# Patient Record
Sex: FEMALE | Race: BLACK OR AFRICAN AMERICAN | NOT HISPANIC OR LATINO | Employment: OTHER | URBAN - METROPOLITAN AREA
[De-identification: names, ages, dates, MRNs, and addresses within clinical notes are randomized per-mention and may not be internally consistent; named-entity substitution may affect disease eponyms.]

---

## 2023-04-18 ENCOUNTER — HOSPITAL ENCOUNTER (OUTPATIENT)
Dept: DATA CONVERSION | Facility: HOSPITAL | Age: 73
End: 2023-04-18
Attending: INTERNAL MEDICINE | Admitting: INTERNAL MEDICINE
Payer: COMMERCIAL

## 2023-04-18 DIAGNOSIS — Z86.010 PERSONAL HISTORY OF COLONIC POLYPS: ICD-10-CM

## 2023-04-18 DIAGNOSIS — B20 HUMAN IMMUNODEFICIENCY VIRUS (HIV) DISEASE (MULTI): ICD-10-CM

## 2023-04-18 DIAGNOSIS — Z12.11 ENCOUNTER FOR SCREENING FOR MALIGNANT NEOPLASM OF COLON: ICD-10-CM

## 2023-04-18 DIAGNOSIS — K64.8 OTHER HEMORRHOIDS: ICD-10-CM

## 2023-04-18 DIAGNOSIS — Z86.19 PERSONAL HISTORY OF OTHER INFECTIOUS AND PARASITIC DISEASES: ICD-10-CM

## 2023-04-18 DIAGNOSIS — K57.30 DIVERTICULOSIS OF LARGE INTESTINE WITHOUT PERFORATION OR ABSCESS WITHOUT BLEEDING: ICD-10-CM

## 2023-04-18 DIAGNOSIS — D12.5 BENIGN NEOPLASM OF SIGMOID COLON: ICD-10-CM

## 2023-05-01 LAB
COMPLETE PATHOLOGY REPORT: NORMAL
CONVERTED CLINICAL DIAGNOSIS-HISTORY: NORMAL
CONVERTED FINAL DIAGNOSIS: NORMAL
CONVERTED FINAL REPORT PDF LINK TO COPY AND PASTE: NORMAL
CONVERTED GROSS DESCRIPTION: NORMAL

## 2023-05-09 LAB
ALANINE AMINOTRANSFERASE (SGPT) (U/L) IN SER/PLAS: 9 U/L (ref 7–45)
ALBUMIN (G/DL) IN SER/PLAS: 4.3 G/DL (ref 3.4–5)
ALKALINE PHOSPHATASE (U/L) IN SER/PLAS: 54 U/L (ref 33–136)
ANION GAP IN SER/PLAS: 13 MMOL/L (ref 10–20)
ASPARTATE AMINOTRANSFERASE (SGOT) (U/L) IN SER/PLAS: 18 U/L (ref 9–39)
BASOPHILS (10*3/UL) IN BLOOD BY AUTOMATED COUNT: 0.08 X10E9/L (ref 0–0.1)
BASOPHILS/100 LEUKOCYTES IN BLOOD BY AUTOMATED COUNT: 1.2 % (ref 0–2)
BILIRUBIN TOTAL (MG/DL) IN SER/PLAS: 0.3 MG/DL (ref 0–1.2)
CALCIUM (MG/DL) IN SER/PLAS: 9.7 MG/DL (ref 8.6–10.6)
CARBON DIOXIDE, TOTAL (MMOL/L) IN SER/PLAS: 27 MMOL/L (ref 21–32)
CD3+CD4+ ABSOLUTE: 1.53 X10E9/L (ref 0.35–2.74)
CD3+CD8+ ABSOLUTE: 0.94 X10E9/L (ref 0.08–1.49)
CD4/CD8 RATIO: 1.63 (ref 1–3.5)
CD45%: 100 %
CHLORIDE (MMOL/L) IN SER/PLAS: 103 MMOL/L (ref 98–107)
CP CD3+CD4+%: 52 % (ref 29–57)
CP CD3+CD8+%: 32 % (ref 7–31)
CREATININE (MG/DL) IN SER/PLAS: 1.03 MG/DL (ref 0.5–1.05)
EOSINOPHILS (10*3/UL) IN BLOOD BY AUTOMATED COUNT: 0.07 X10E9/L (ref 0–0.4)
EOSINOPHILS/100 LEUKOCYTES IN BLOOD BY AUTOMATED COUNT: 1 % (ref 0–6)
ERYTHROCYTE DISTRIBUTION WIDTH (RATIO) BY AUTOMATED COUNT: 17.7 % (ref 11.5–14.5)
ERYTHROCYTE MEAN CORPUSCULAR HEMOGLOBIN CONCENTRATION (G/DL) BY AUTOMATED: 30.6 G/DL (ref 32–36)
ERYTHROCYTE MEAN CORPUSCULAR VOLUME (FL) BY AUTOMATED COUNT: 83 FL (ref 80–100)
ERYTHROCYTES (10*6/UL) IN BLOOD BY AUTOMATED COUNT: 4.01 X10E12/L (ref 4–5.2)
FMETH: ABNORMAL
FSIT1: ABNORMAL
GFR FEMALE: 58 ML/MIN/1.73M2
GLUCOSE (MG/DL) IN SER/PLAS: 85 MG/DL (ref 74–99)
HEMATOCRIT (%) IN BLOOD BY AUTOMATED COUNT: 33.3 % (ref 36–46)
HEMOGLOBIN (G/DL) IN BLOOD: 10.2 G/DL (ref 12–16)
IMMATURE GRANULOCYTES/100 LEUKOCYTES IN BLOOD BY AUTOMATED COUNT: 0.4 % (ref 0–0.9)
LEUKOCYTES (10*3/UL) IN BLOOD BY AUTOMATED COUNT: 7 X10E9/L (ref 4.4–11.3)
LYMPHOCYTES (10*3/UL) IN BLOOD BY AUTOMATED COUNT: 2.95 X10E9/L (ref 0.8–3)
LYMPHOCYTES/100 LEUKOCYTES IN BLOOD BY AUTOMATED COUNT: 42.4 % (ref 13–44)
MONOCYTES (10*3/UL) IN BLOOD BY AUTOMATED COUNT: 0.44 X10E9/L (ref 0.05–0.8)
MONOCYTES/100 LEUKOCYTES IN BLOOD BY AUTOMATED COUNT: 6.3 % (ref 2–10)
NEUTROPHILS (10*3/UL) IN BLOOD BY AUTOMATED COUNT: 3.38 X10E9/L (ref 1.6–5.5)
NEUTROPHILS/100 LEUKOCYTES IN BLOOD BY AUTOMATED COUNT: 48.7 % (ref 40–80)
NRBC (PER 100 WBCS) BY AUTOMATED COUNT: 0 /100 WBC (ref 0–0)
PLATELETS (10*3/UL) IN BLOOD AUTOMATED COUNT: 314 X10E9/L (ref 150–450)
POTASSIUM (MMOL/L) IN SER/PLAS: 4.1 MMOL/L (ref 3.5–5.3)
PROTEIN TOTAL: 7.3 G/DL (ref 6.4–8.2)
SODIUM (MMOL/L) IN SER/PLAS: 139 MMOL/L (ref 136–145)
UREA NITROGEN (MG/DL) IN SER/PLAS: 14 MG/DL (ref 6–23)

## 2023-05-10 LAB
HIV-1 RNA PCR VIRAL LOAD LOG: ABNORMAL LOG10 CPY/ML
HIV-1 RNA VIRAL LOAD: ABNORMAL COPIES/ML

## 2023-09-06 LAB
ALANINE AMINOTRANSFERASE (SGPT) (U/L) IN SER/PLAS: 11 U/L (ref 7–45)
ALBUMIN (G/DL) IN SER/PLAS: 4 G/DL (ref 3.4–5)
ALKALINE PHOSPHATASE (U/L) IN SER/PLAS: 57 U/L (ref 33–136)
ANION GAP IN SER/PLAS: 15 MMOL/L (ref 10–20)
ASPARTATE AMINOTRANSFERASE (SGOT) (U/L) IN SER/PLAS: 22 U/L (ref 9–39)
BASOPHILS (10*3/UL) IN BLOOD BY AUTOMATED COUNT: 0.06 X10E9/L (ref 0–0.1)
BASOPHILS/100 LEUKOCYTES IN BLOOD BY AUTOMATED COUNT: 0.8 % (ref 0–2)
BILIRUBIN TOTAL (MG/DL) IN SER/PLAS: 0.3 MG/DL (ref 0–1.2)
CALCIUM (MG/DL) IN SER/PLAS: 9.3 MG/DL (ref 8.6–10.6)
CARBON DIOXIDE, TOTAL (MMOL/L) IN SER/PLAS: 25 MMOL/L (ref 21–32)
CHLORIDE (MMOL/L) IN SER/PLAS: 101 MMOL/L (ref 98–107)
CHOLESTEROL (MG/DL) IN SER/PLAS: 189 MG/DL (ref 0–199)
CHOLESTEROL IN HDL (MG/DL) IN SER/PLAS: 76.1 MG/DL
CHOLESTEROL/HDL RATIO: 2.5
CREATININE (MG/DL) IN SER/PLAS: 1.14 MG/DL (ref 0.5–1.05)
EOSINOPHILS (10*3/UL) IN BLOOD BY AUTOMATED COUNT: 0.08 X10E9/L (ref 0–0.4)
EOSINOPHILS/100 LEUKOCYTES IN BLOOD BY AUTOMATED COUNT: 1.1 % (ref 0–6)
ERYTHROCYTE DISTRIBUTION WIDTH (RATIO) BY AUTOMATED COUNT: 17.1 % (ref 11.5–14.5)
ERYTHROCYTE MEAN CORPUSCULAR HEMOGLOBIN CONCENTRATION (G/DL) BY AUTOMATED: 30.8 G/DL (ref 32–36)
ERYTHROCYTE MEAN CORPUSCULAR VOLUME (FL) BY AUTOMATED COUNT: 85 FL (ref 80–100)
ERYTHROCYTES (10*6/UL) IN BLOOD BY AUTOMATED COUNT: 4 X10E12/L (ref 4–5.2)
ESTIMATED AVERAGE GLUCOSE FOR HBA1C: 117 MG/DL
GFR FEMALE: 51 ML/MIN/1.73M2
GLUCOSE (MG/DL) IN SER/PLAS: 82 MG/DL (ref 74–99)
HEMATOCRIT (%) IN BLOOD BY AUTOMATED COUNT: 33.8 % (ref 36–46)
HEMOGLOBIN (G/DL) IN BLOOD: 10.4 G/DL (ref 12–16)
HEMOGLOBIN A1C/HEMOGLOBIN TOTAL IN BLOOD: 5.7 %
IMMATURE GRANULOCYTES/100 LEUKOCYTES IN BLOOD BY AUTOMATED COUNT: 0.3 % (ref 0–0.9)
LDL: 100 MG/DL (ref 0–99)
LEUKOCYTES (10*3/UL) IN BLOOD BY AUTOMATED COUNT: 7.6 X10E9/L (ref 4.4–11.3)
LYMPHOCYTES (10*3/UL) IN BLOOD BY AUTOMATED COUNT: 2.89 X10E9/L (ref 0.8–3)
LYMPHOCYTES/100 LEUKOCYTES IN BLOOD BY AUTOMATED COUNT: 38 % (ref 13–44)
MONOCYTES (10*3/UL) IN BLOOD BY AUTOMATED COUNT: 0.54 X10E9/L (ref 0.05–0.8)
MONOCYTES/100 LEUKOCYTES IN BLOOD BY AUTOMATED COUNT: 7.1 % (ref 2–10)
NEUTROPHILS (10*3/UL) IN BLOOD BY AUTOMATED COUNT: 4.02 X10E9/L (ref 1.6–5.5)
NEUTROPHILS/100 LEUKOCYTES IN BLOOD BY AUTOMATED COUNT: 52.7 % (ref 40–80)
NRBC (PER 100 WBCS) BY AUTOMATED COUNT: 0 /100 WBC (ref 0–0)
PLATELETS (10*3/UL) IN BLOOD AUTOMATED COUNT: 289 X10E9/L (ref 150–450)
POTASSIUM (MMOL/L) IN SER/PLAS: 4 MMOL/L (ref 3.5–5.3)
PROTEIN TOTAL: 7 G/DL (ref 6.4–8.2)
SODIUM (MMOL/L) IN SER/PLAS: 137 MMOL/L (ref 136–145)
SYPHILIS TOTAL AB: REACTIVE
TRIGLYCERIDE (MG/DL) IN SER/PLAS: 64 MG/DL (ref 0–149)
UREA NITROGEN (MG/DL) IN SER/PLAS: 17 MG/DL (ref 6–23)
VLDL: 13 MG/DL (ref 0–40)

## 2023-09-07 LAB
CD3+CD4+ ABSOLUTE: 1.45 X10E9/L (ref 0.35–2.74)
CD3+CD8+ ABSOLUTE: 0.9 X10E9/L (ref 0.08–1.49)
CD4/CD8 RATIO: 1.61 (ref 1–3.5)
CD45%: 100 %
CP CD3+CD4+%: 50 % (ref 29–57)
CP CD3+CD8+%: 31 % (ref 7–31)
FMETH: NORMAL
FSIT1: NORMAL
HIV-1 RNA PCR VIRAL LOAD LOG: NORMAL LOG10 CPY/ML
HIV-1 RNA VIRAL LOAD: NOT DETECTED COPIES/ML

## 2023-09-08 LAB
RPR: NONREACTIVE
SYPHILIS INTEGRATED INTERPRETATION: ABNORMAL
SYPHILIS TOTAL AB: REACTIVE
TREPONEMA PALLIDUM CONFIRMATION: REACTIVE

## 2023-09-12 LAB
CHLAMYDIA TRACH., AMPLIFIED: NEGATIVE
N. GONORRHEA, AMPLIFIED: NEGATIVE

## 2023-10-03 ENCOUNTER — TELEPHONE (OUTPATIENT)
Dept: IMMUNOLOGY | Facility: CLINIC | Age: 73
End: 2023-10-03
Payer: COMMERCIAL

## 2023-10-13 ENCOUNTER — TELEPHONE (OUTPATIENT)
Dept: IMMUNOLOGY | Facility: CLINIC | Age: 73
End: 2023-10-13
Payer: COMMERCIAL

## 2023-10-13 DIAGNOSIS — S99.922A INJURY OF LEFT FOOT, INITIAL ENCOUNTER: ICD-10-CM

## 2023-10-16 ENCOUNTER — HOSPITAL ENCOUNTER (OUTPATIENT)
Dept: RADIOLOGY | Facility: HOSPITAL | Age: 73
Discharge: HOME | End: 2023-10-16
Payer: COMMERCIAL

## 2023-10-16 DIAGNOSIS — S99.922A INJURY OF LEFT FOOT, INITIAL ENCOUNTER: ICD-10-CM

## 2023-10-16 PROCEDURE — 73630 X-RAY EXAM OF FOOT: CPT | Mod: LEFT SIDE | Performed by: RADIOLOGY

## 2023-10-16 PROCEDURE — 73630 X-RAY EXAM OF FOOT: CPT | Mod: LT,FY

## 2023-10-18 ENCOUNTER — TELEPHONE (OUTPATIENT)
Dept: IMMUNOLOGY | Facility: CLINIC | Age: 73
End: 2023-10-18
Payer: COMMERCIAL

## 2023-10-18 DIAGNOSIS — H26.9 CATARACT OF BOTH EYES, UNSPECIFIED CATARACT TYPE: ICD-10-CM

## 2023-10-18 NOTE — TELEPHONE ENCOUNTER
Patient called for foot xray results.  Left foot is not broken.  Patient said her toe still hurts.  Asked her to try NSAIDS and a warm compress.  Also asked for a referral to ophthalmology for assessment for cataracts and the need for surgery.

## 2023-10-23 DIAGNOSIS — B20 HUMAN IMMUNODEFICIENCY VIRUS (MULTI): Primary | ICD-10-CM

## 2023-10-24 RX ORDER — CABOTEGRAVIR AND RILPIVIRINE 600-900/3
KIT INTRAMUSCULAR
Qty: 6 ML | Refills: 3 | Status: SHIPPED | OUTPATIENT
Start: 2023-10-24 | End: 2024-06-06 | Stop reason: SDUPTHER

## 2023-10-25 PROBLEM — B18.2 HEPATITIS C, CHRONIC (MULTI): Status: ACTIVE | Noted: 2023-10-25

## 2023-10-25 PROBLEM — F64.9 GENDER DYSPHORIA: Status: ACTIVE | Noted: 2023-10-25

## 2023-10-25 PROBLEM — L72.0 EPIDERMAL CYST: Status: ACTIVE | Noted: 2023-01-12

## 2023-10-25 PROBLEM — K75.9 INFLAMMATORY LIVER DISEASE: Status: ACTIVE | Noted: 2023-10-25

## 2023-10-25 PROBLEM — K21.9 GERD (GASTROESOPHAGEAL REFLUX DISEASE): Status: ACTIVE | Noted: 2023-10-25

## 2023-10-25 PROBLEM — H90.3 SENSORINEURAL HEARING LOSS (SNHL) OF BOTH EARS: Status: ACTIVE | Noted: 2023-10-25

## 2023-10-25 PROBLEM — R73.03 PRE-DIABETES: Status: ACTIVE | Noted: 2017-08-03

## 2023-10-25 PROBLEM — H40.1112 PRIMARY OPEN ANGLE GLAUCOMA OF RIGHT EYE, MODERATE STAGE: Status: ACTIVE | Noted: 2023-10-25

## 2023-10-25 PROBLEM — H40.1122 PRIMARY OPEN ANGLE GLAUCOMA OF LEFT EYE, MODERATE STAGE: Status: ACTIVE | Noted: 2023-10-25

## 2023-10-25 PROBLEM — H40.1131 PRIMARY OPEN-ANGLE GLAUCOMA, BILATERAL, MILD STAGE: Status: ACTIVE | Noted: 2023-10-25

## 2023-10-25 PROBLEM — H52.4 BILATERAL PRESBYOPIA: Status: ACTIVE | Noted: 2023-10-25

## 2023-10-25 PROBLEM — E88.1: Status: ACTIVE | Noted: 2023-10-25

## 2023-10-25 PROBLEM — B20: Status: ACTIVE | Noted: 2023-10-25

## 2023-10-25 PROBLEM — D12.6 TUBULAR ADENOMA OF COLON: Status: ACTIVE | Noted: 2023-10-25

## 2023-10-25 PROBLEM — A60.00 GENITAL HSV: Status: ACTIVE | Noted: 2023-10-25

## 2023-10-25 PROBLEM — B20 ACQUIRED IMMUNODEFICIENCY SYNDROME (MULTI): Status: ACTIVE | Noted: 2023-10-25

## 2023-10-25 PROBLEM — H25.813 COMBINED FORM OF AGE-RELATED CATARACT, BOTH EYES: Status: ACTIVE | Noted: 2023-10-25

## 2023-10-25 PROBLEM — H93.11 SUBJECTIVE TINNITUS OF RIGHT EAR: Status: ACTIVE | Noted: 2023-10-25

## 2023-10-25 PROBLEM — B20 HIV DISEASE (MULTI): Status: ACTIVE | Noted: 2023-10-25

## 2023-10-25 PROBLEM — H93.A1 SUBJECTIVE PULSATILE TINNITUS OF RIGHT EAR: Status: ACTIVE | Noted: 2023-10-25

## 2023-10-25 PROBLEM — E65 CENTRAL OBESITY: Status: ACTIVE | Noted: 2023-10-25

## 2023-10-25 PROBLEM — E78.5 HYPERLIPIDEMIA: Status: ACTIVE | Noted: 2023-10-25

## 2023-10-25 PROBLEM — H52.7 REFRACTIVE ERROR: Status: ACTIVE | Noted: 2023-10-25

## 2023-10-25 RX ORDER — ESTRADIOL 0.07 MG/D
PATCH TRANSDERMAL
COMMUNITY
Start: 2015-10-13 | End: 2023-12-08 | Stop reason: SDUPTHER

## 2023-10-25 RX ORDER — ABACAVIR AND LAMIVUDINE 600; 300 MG/1; MG/1
1 TABLET, FILM COATED ORAL DAILY
COMMUNITY
Start: 2015-10-08

## 2023-10-25 RX ORDER — ESTRADIOL 0.1 MG/D
1 PATCH TRANSDERMAL
COMMUNITY
Start: 2019-11-15 | End: 2023-12-08 | Stop reason: SDUPTHER

## 2023-10-25 RX ORDER — DOCUSATE SODIUM 100 MG/1
1 CAPSULE, LIQUID FILLED ORAL 2 TIMES DAILY
COMMUNITY
End: 2024-06-04 | Stop reason: SDUPTHER

## 2023-10-25 RX ORDER — ATORVASTATIN CALCIUM 40 MG/1
40 TABLET, FILM COATED ORAL DAILY
COMMUNITY
Start: 2023-08-08

## 2023-10-25 RX ORDER — FINASTERIDE 5 MG/1
5 TABLET, FILM COATED ORAL DAILY
COMMUNITY
Start: 2023-07-23 | End: 2023-12-08 | Stop reason: SDUPTHER

## 2023-10-25 RX ORDER — DOCUSATE SODIUM 100 MG/1
1 CAPSULE, LIQUID FILLED ORAL 2 TIMES DAILY PRN
COMMUNITY
Start: 2015-10-08

## 2023-10-25 RX ORDER — ESTRADIOL 0.04 MG/D
FILM, EXTENDED RELEASE TRANSDERMAL
COMMUNITY
Start: 2023-03-03 | End: 2023-11-01 | Stop reason: SDUPTHER

## 2023-10-25 RX ORDER — ACYCLOVIR 200 MG/1
1 CAPSULE ORAL 2 TIMES DAILY
COMMUNITY
Start: 2015-10-19

## 2023-10-25 RX ORDER — LOPINAVIR AND RITONAVIR 200; 50 MG/1; MG/1
2 TABLET, FILM COATED ORAL 2 TIMES DAILY
COMMUNITY
Start: 2015-10-08 | End: 2023-11-01 | Stop reason: ALTCHOICE

## 2023-10-25 RX ORDER — ASPIRIN 81 MG/1
81 TABLET ORAL DAILY
COMMUNITY
Start: 2020-08-31

## 2023-10-25 RX ORDER — LATANOPROST 50 UG/ML
1 SOLUTION/ DROPS OPHTHALMIC NIGHTLY
COMMUNITY
Start: 2023-07-31 | End: 2023-11-21 | Stop reason: SDUPTHER

## 2023-10-26 ENCOUNTER — OFFICE VISIT (OUTPATIENT)
Dept: OPHTHALMOLOGY | Facility: CLINIC | Age: 73
End: 2023-10-26
Payer: COMMERCIAL

## 2023-10-26 DIAGNOSIS — H40.1131 PRIMARY OPEN ANGLE GLAUCOMA (POAG) OF BOTH EYES, MILD STAGE: Primary | ICD-10-CM

## 2023-10-26 DIAGNOSIS — H26.9 CATARACT OF BOTH EYES, UNSPECIFIED CATARACT TYPE: ICD-10-CM

## 2023-10-26 PROCEDURE — 1159F MED LIST DOCD IN RCRD: CPT | Performed by: OPHTHALMOLOGY

## 2023-10-26 PROCEDURE — 99214 OFFICE O/P EST MOD 30 MIN: CPT | Performed by: OPHTHALMOLOGY

## 2023-10-26 PROCEDURE — 92136 OPHTHALMIC BIOMETRY: CPT | Mod: BILATERAL PROCEDURE | Performed by: OPHTHALMOLOGY

## 2023-10-26 PROCEDURE — 92136 OPHTHALMIC BIOMETRY: CPT | Performed by: OPHTHALMOLOGY

## 2023-10-26 PROCEDURE — 1036F TOBACCO NON-USER: CPT | Performed by: OPHTHALMOLOGY

## 2023-10-26 PROCEDURE — 1126F AMNT PAIN NOTED NONE PRSNT: CPT | Performed by: OPHTHALMOLOGY

## 2023-10-26 PROCEDURE — 92083 EXTENDED VISUAL FIELD XM: CPT | Performed by: OPHTHALMOLOGY

## 2023-10-26 PROCEDURE — 92133 CPTRZD OPH DX IMG PST SGM ON: CPT | Performed by: OPHTHALMOLOGY

## 2023-10-26 RX ORDER — DICLOFENAC SODIUM 1 MG/ML
1 SOLUTION/ DROPS OPHTHALMIC
Status: CANCELLED | OUTPATIENT
Start: 2023-10-26 | End: 2023-10-26

## 2023-10-26 RX ORDER — MOXIFLOXACIN 5 MG/ML
1 SOLUTION/ DROPS OPHTHALMIC
Status: CANCELLED | OUTPATIENT
Start: 2023-10-26 | End: 2023-10-26

## 2023-10-26 RX ORDER — PHENYLEPHRINE HYDROCHLORIDE 25 MG/ML
1 SOLUTION/ DROPS OPHTHALMIC
Status: CANCELLED | OUTPATIENT
Start: 2023-10-26 | End: 2023-10-26

## 2023-10-26 RX ORDER — CYCLOPENTOLATE HYDROCHLORIDE 10 MG/ML
1 SOLUTION/ DROPS OPHTHALMIC
Status: CANCELLED | OUTPATIENT
Start: 2023-10-26 | End: 2023-10-26

## 2023-10-26 RX ORDER — TROPICAMIDE 10 MG/ML
1 SOLUTION/ DROPS OPHTHALMIC
Status: CANCELLED | OUTPATIENT
Start: 2023-10-26 | End: 2023-10-26

## 2023-10-26 ASSESSMENT — VISUAL ACUITY
METHOD: SNELLEN - LINEAR
OD_CC: 20/500
OS_BAT_MED: 20/50
OS_CC: 20/20
OS_CC+: -2

## 2023-10-26 ASSESSMENT — EXTERNAL EXAM - LEFT EYE: OS_EXAM: NORMAL

## 2023-10-26 ASSESSMENT — CONF VISUAL FIELD
OS_INFERIOR_NASAL_RESTRICTION: 0
OD_INFERIOR_NASAL_RESTRICTION: 0
METHOD: COUNTING FINGERS
OS_NORMAL: 1
OD_SUPERIOR_TEMPORAL_RESTRICTION: 0
OD_NORMAL: 1
OS_INFERIOR_TEMPORAL_RESTRICTION: 0
OS_SUPERIOR_TEMPORAL_RESTRICTION: 0
OS_SUPERIOR_NASAL_RESTRICTION: 0
OD_SUPERIOR_NASAL_RESTRICTION: 0
OD_INFERIOR_TEMPORAL_RESTRICTION: 0

## 2023-10-26 ASSESSMENT — REFRACTION_MANIFEST
OS_CYLINDER: -2.00
OD_CYLINDER: -1.50
OS_AXIS: 095
OD_AXIS: 088
OS_ADD: +2.75
OS_SPHERE: +3.00
OD_ADD: +2.75
OD_SPHERE: -3.50

## 2023-10-26 ASSESSMENT — REFRACTION_WEARINGRX
OD_CYLINDER: -1.25
OS_CYLINDER: -1.75
OS_ADD: +2.75
OD_SPHERE: -3.50
OS_SPHERE: +2.75
OD_ADD: +2.75
OS_AXIS: 100
OD_AXIS: 100

## 2023-10-26 ASSESSMENT — SLIT LAMP EXAM - LIDS
COMMENTS: NORMAL
COMMENTS: NORMAL

## 2023-10-26 ASSESSMENT — CUP TO DISC RATIO
OD_RATIO: 0.75
OS_RATIO: 0.7

## 2023-10-26 ASSESSMENT — TONOMETRY
OD_IOP_MMHG: 14
IOP_METHOD: GOLDMANN APPLANATION
OS_IOP_MMHG: 14

## 2023-10-26 ASSESSMENT — EXTERNAL EXAM - RIGHT EYE: OD_EXAM: NORMAL

## 2023-10-26 NOTE — PROGRESS NOTES
History    Chief Complaint    Blurred Vision       HPI       Blurred Vision    In right eye.  Vision is blurred.  Severity is moderate.             Comments    Patient has rtn for glaucoma and cataract evaluation. Patient sts vision has been unchanged. Vision right eye (OD) is worse than the left eye (OS). No glare issues. No pian at this time. No flashing lights. Occasional floater. Does use the Latanoprost every night. Used last night both eyes (OU).           Last edited by ANDRE Cormier on 10/26/2023 10:34 AM.        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)    Inflammatory liver disease    Hyperlipidemia    HIV disease (CMS/HCC)    Hepatitis C, chronic (CMS/HCC)    Goiter    GERD (gastroesophageal reflux disease)    Genital HSV    Gender dysphoria    Gender dysphoria in adult    Epidermal cyst    Combined form of age-related cataract, both eyes    Central obesity    Bilateral presbyopia    Acquired immunodeficiency syndrome (CMS/HCC)       Past Medical History:   Diagnosis Date    Combined forms of age-related cataract, left eye 10/25/2018    Combined form of age-related cataract, left eye    Combined forms of age-related cataract, right eye 10/25/2018    Combined form of age-related cataract, right eye    Cortical age-related cataract, unspecified eye 06/28/2016    Cataract cortical, senile    Disorder of ligament, unspecified wrist     Disorder of ligament of wrist    Encounter for screening for malignant neoplasm of colon 08/13/2018    Colon cancer screening    Furuncle of back (any part, except  buttock) 11/01/2018    Boil, back    Furuncle, unspecified     Boil    Furuncle, unspecified 08/12/2022    Boil    Human immunodeficiency virus (HIV) disease (CMS/[facility]) 01/16/2018    HIV infection    Human immunodeficiency virus (HIV) disease (CMS/[facility]) 01/16/2018    HIV disease    Hypermetropia, bilateral 10/24/2022    Hyperopia of both eyes with astigmatism and presbyopia    Hypermetropia, unspecified eye 09/18/2018    Hyperopia with astigmatism and presbyopia    Melena 02/16/2015    Blood in stool    Nicotine dependence, cigarettes, uncomplicated 02/04/2019    Cigarette nicotine dependence without complication    Other abnormal and inconclusive findings on diagnostic imaging of breast 10/21/2015    Abnormal finding on mammography    Other conditions influencing health status     Epicondylitis    Other conditions influencing health status 06/25/2021    History of cough    Pain in left shoulder 10/25/2016    Left shoulder pain    Pain in left shoulder 11/21/2016    Left shoulder pain, unspecified chronicity    Pain in right shoulder 11/21/2016    Right shoulder pain    Person consulting for explanation of examination or test findings     Visit for review of DEXA scan    Personal history of infections of the central nervous system     History of meningitis    Personal history of nicotine dependence 08/13/2018    History of nicotine dependence    Personal history of other diseases of the digestive system 04/28/2014    History of constipation    Personal history of other diseases of the musculoskeletal system and connective tissue 05/30/2017    History of low back pain    Personal history of other infectious and parasitic diseases     History of syphilis    Personal history of other infectious and parasitic diseases 04/28/2014    History of herpes simplex infection    Personal history of other specified conditions     History of vomiting    Personal history of other specified conditions     History of abnormal weight  loss    Preglaucoma, unspecified, bilateral 01/16/2018    Glaucoma suspect of both eyes    Preglaucoma, unspecified, unspecified eye 05/30/2017    Glaucoma suspect    Preglaucoma, unspecified, unspecified eye     Glaucoma suspect    Primary open-angle glaucoma, bilateral, moderate stage 01/16/2018    Primary open angle glaucoma of both eyes, moderate stage    Rash and other nonspecific skin eruption     Skin rash    Rash and other nonspecific skin eruption 08/24/2015    Rash    Rash and other nonspecific skin eruption 04/28/2014    Rash     Past Surgical History:   Procedure Laterality Date    COLONOSCOPY  04/28/2014    Colonoscopy (Fiberoptic)    OTHER SURGICAL HISTORY  01/27/2014    Biopsy Of Liver    OTHER SURGICAL HISTORY  01/27/2014    Laryngoscopy (Diagnostic)     SOCIAL HISTORY   SMOKING:  reports that she has never smoked. She has never used smokeless tobacco.  DRUG USE:    reports no history of drug use.    FAMILY HISTORY  family history includes Alzheimer's disease in her mother; Breast cancer in her mother; Cataracts in her mother; Diabetes in her brother and mother.    CURRENT MEDICATIONS  Current Outpatient Medications (Ophthalmology pharm classes)   Medication Sig Dispense Refill    latanoprost (Xalatan) 0.005 % ophthalmic solution Administer 1 drop into both eyes once daily at bedtime.       Current Outpatient Medications (Other)   Medication Sig Dispense Refill    acyclovir (Zovirax) 200 mg capsule Take 1 capsule (200 mg) by mouth 2 times a day.      aspirin 81 mg EC tablet Take 1 tablet (81 mg) by mouth once daily.      atorvastatin (Lipitor) 40 mg tablet Take 1 tablet (40 mg) by mouth once daily.      cabotegravir-rilpivirine (Cabenuva) 600 mg/3 mL- 900 mg/3 mL suspension,extended release INJECT 3 ML OF CABOTEGRAVIR AND 3 ML OF RILPIVIRINE INTO THE MUSCLE EVERY 2 MONTHS. 6 mL 3    docusate sodium (Colace) 100 mg capsule Take 1 capsule (100 mg) by mouth 2 times a day as needed.      estradiol  (Climara) 0.075 mg/24 hr patch Apply one patch twice weekly      finasteride (Proscar) 5 mg tablet Take 1 tablet (5 mg) by mouth once daily.      abacavir-lamiVUDine (Epzicom) 600-300 mg tablet Take 1 tablet by mouth once daily.      docusate sodium (Colace) 100 mg capsule Take 1 capsule (100 mg) by mouth 2 times a day.      estradiol (Climara) 0.1 mg/24 hr patch Place 1 patch on the skin 1 (one) time per week.      estradiol (Vivelle-DOT) 0.0375 mg/24 hr APPLY 1 PATCH TO SKIN TWICE WEEKLY.      lopinavir-ritonavir (Kaletra) 200-50 mg tablet Take 2 tablets by mouth twice a day.      MAGNESIUM OXIDE ORAL Take 400 mg by mouth twice a day.      RANITIDINE HCL ORAL Take 300 mg by mouth once daily.         Exam   Visual Acuity (Snellen - Linear)         Right Left    Dist cc 20/500 20/20 -2              Edited by: Earle Crook, COT              Wearing Rx       Wearing Rx         Sphere Cylinder Axis Add    Right -3.50 -1.25 100 +2.75    Left +2.75 -1.75 100 +2.75                  Manifest Refraction       Manifest Refraction         Sphere Cylinder Axis Dist VA Add    Right -3.50 -1.50 088 20/70 +2.75    Left +3.00 -2.00 095 20/20 +2.75                  Pupils       Pupils         Pupils    Right PERRL, No APD    Left PERRL, No APD                  Intraocular pressure was 14 in the right eye and 14 in the left eye using Goldmann Applanation.  Extraocular Movement       Extraocular Movement         Right Left     Full Full                  Not recorded       Not recorded        External Exam         Right Left    External Normal Normal              Slit Lamp Exam         Right Left    Lids/Lashes Normal Normal    Conjunctiva/Sclera White and quiet White and quiet    Cornea Clear Clear    Anterior Chamber Deep and quiet Deep and quiet    Iris Round and reactive Round and reactive    Lens 2+nsc 2+nsc    Anterior Vitreous Normal Normal              Fundus Exam         Right Left    Disc Normal Normal    C/D Ratio 0.75  "0.7    Macula Normal Normal    Vessels Normal Normal    Periphery Normal Normal                   <div id=\"MAIN_EXAM_REVIEWED\"></div>       Diagnostics  Mckinnon Visual Field - OU - Both Eyes          Right Eye  Threshold was 24-2. Findings include normal observations.     Left Eye  Threshold was 24-2. Findings include normal observations.     Notes  No evidence of glaucoma OU         OCT, Optic Nerve - OU - Both Eyes          Right Eye  Images reviewed and comparison made to baseline.     Left Eye  Images reviewed and comparison made to baseline.     Notes  First time Zeiss, robust OU  Slight thinning from [place]         IOL Biometry - OU - Both Eyes          done           Plan   -  The primary encounter diagnosis was Primary open angle glaucoma (POAG) of both eyes, mild stage. A diagnosis of Cataract of both eyes, unspecified cataract type was also pertinent to this visit.  -  Referred By:  Quita Caballero  -  IOP:  Last Tonometry OD 14 / OS 14 /Date 12:21 PM      Target OD: No Value exists for the : EPIC#ROL938 Target OS: No Value exists for the : EPIC#OUA135       Max pressure OD:   Date:         Max Pressure OS:   Date:    -    Not recorded         -    Not recorded       -  Pathophysiology of glaucoma, and potential blinding nature of disease reviewed.      Importance of follow up and compliance stressed  Overall patient is low risk  Given below recommend proceeding with ce/iol/abic OD then OS  Continue latanoprost qhs ou IN THE meantime    Mishel Wagner 73 y.o. presents with visually significant cataract of both eye(s). The cataract(s) is/are affecting activities of daily living including reading, watching tv, and driving. It is believed removal of the cataract will improve vision and thus quality of life. After discussing r/b/a to surgery the patient elected to proceedright eye first then left. Lens options were discussed including pros/cons/and eligibility for monofocal, toric, multifocal, and " toric multifocal lenses and patient elected to proceed with monofocal. patient's current mrx is  mixed astigmatism . target after surgery will be plano. . The patient was told to continue all medications through surgery. The patient hasanesthesia will planned to be mac with topical. Will combine with abive for above

## 2023-11-01 DIAGNOSIS — B20 HIV DISEASE (MULTI): Primary | ICD-10-CM

## 2023-11-01 DIAGNOSIS — Z78.0 MENOPAUSE: Primary | ICD-10-CM

## 2023-11-01 RX ORDER — ESTRADIOL 0.04 MG/D
FILM, EXTENDED RELEASE TRANSDERMAL
Qty: 24 PATCH | Refills: 2 | Status: SHIPPED | OUTPATIENT
Start: 2023-11-01 | End: 2023-12-08 | Stop reason: SDUPTHER

## 2023-11-02 ENCOUNTER — CLINICAL SUPPORT (OUTPATIENT)
Dept: IMMUNOLOGY | Facility: CLINIC | Age: 73
End: 2023-11-02
Payer: COMMERCIAL

## 2023-11-02 DIAGNOSIS — B20 HIV INFECTION, UNSPECIFIED SYMPTOM STATUS (MULTI): ICD-10-CM

## 2023-11-02 PROBLEM — H40.1131 PRIMARY OPEN ANGLE GLAUCOMA (POAG) OF BOTH EYES, MILD STAGE: Status: ACTIVE | Noted: 2023-10-26

## 2023-11-02 PROBLEM — H26.9 CATARACT OF BOTH EYES: Status: ACTIVE | Noted: 2023-10-26

## 2023-11-02 PROCEDURE — 2500000004 HC RX 250 GENERAL PHARMACY W/ HCPCS (ALT 636 FOR OP/ED): Performed by: INTERNAL MEDICINE

## 2023-11-02 RX ADMIN — CABOTEGRAVIR AND RILPIVIRINE 1 DOSE: KIT at 09:27

## 2023-11-02 NOTE — PROGRESS NOTES
Patient here for cabenuva injections.  Rilpivirine administered to right gluteal muscle without incident.  Cabotegravir administered to left gluteal muscle without incident.  Patient tolerated injections.

## 2023-11-07 RX ORDER — ESTRADIOL 0.1 MG/D
FILM, EXTENDED RELEASE TRANSDERMAL
COMMUNITY
Start: 2023-11-01 | End: 2023-12-08 | Stop reason: SDUPTHER

## 2023-11-07 NOTE — PROGRESS NOTES
Patient called asking date of administered high dose flu shot.  It was administered on September 11, 2023 as recorded in the [facility].  RN contacted Epic help as flu vaccine administration record did ot transfer.

## 2023-11-21 DIAGNOSIS — H40.20X1 PRIMARY ANGLE CLOSURE GLAUCOMA OF BOTH EYES, MILD STAGE, UNSPECIFIED PRIMARY ANGLE-CLOSURE GLAUCOMA TYPE: Primary | ICD-10-CM

## 2023-11-21 RX ORDER — LATANOPROST 50 UG/ML
1 SOLUTION/ DROPS OPHTHALMIC NIGHTLY
Qty: 2.5 ML | Refills: 6 | Status: SHIPPED | OUTPATIENT
Start: 2023-11-21

## 2023-12-07 NOTE — PROGRESS NOTES
FOLLOW-UP VISIT     PROBLEM LIST:  1. Gender dysphoria       HISTORY OF PRESENT ILLNESS:   Mishel Wagner is a 73 y.o. transwoman who was last seen 06/08/23 for a medication refill. We refilled her finasteride and estradiol patch. She is here for a follow up visit.     She reports she is doing well today. She has no new complaints. She has continued utilizing her finasteride and estradiol patch. She does need refills today.       PAST MEDICAL HISTORY:  Past Medical History:   Diagnosis Date    Combined forms of age-related cataract, left eye 10/25/2018    Combined form of age-related cataract, left eye    Combined forms of age-related cataract, right eye 10/25/2018    Combined form of age-related cataract, right eye    Cortical age-related cataract, unspecified eye 06/28/2016    Cataract cortical, senile    Disorder of ligament, unspecified wrist     Disorder of ligament of wrist    Encounter for screening for malignant neoplasm of colon 08/13/2018    Colon cancer screening    Furuncle of back (any part, except buttock) 11/01/2018    Boil, back    Furuncle, unspecified     Boil    Furuncle, unspecified 08/12/2022    Boil    Human immunodeficiency virus (HIV) disease (CMS/[facility]) 01/16/2018    HIV infection    Human immunodeficiency virus (HIV) disease (CMS/[facility]) 01/16/2018    HIV disease    Hypermetropia, bilateral 10/24/2022    Hyperopia of both eyes with astigmatism and presbyopia    Hypermetropia, unspecified eye 09/18/2018    Hyperopia with astigmatism and presbyopia    Melena 02/16/2015    Blood in stool    Nicotine dependence, cigarettes, uncomplicated 02/04/2019    Cigarette nicotine dependence without complication    Other abnormal and inconclusive findings on diagnostic imaging of breast 10/21/2015    Abnormal finding on mammography    Other conditions influencing health status     Epicondylitis    Other conditions influencing health status 06/25/2021    History of cough    Pain in left shoulder 10/25/2016     Left shoulder pain    Pain in left shoulder 11/21/2016    Left shoulder pain, unspecified chronicity    Pain in right shoulder 11/21/2016    Right shoulder pain    Person consulting for explanation of examination or test findings     Visit for review of DEXA scan    Personal history of infections of the central nervous system     History of meningitis    Personal history of nicotine dependence 08/13/2018    History of nicotine dependence    Personal history of other diseases of the digestive system 04/28/2014    History of constipation    Personal history of other diseases of the musculoskeletal system and connective tissue 05/30/2017    History of low back pain    Personal history of other infectious and parasitic diseases     History of syphilis    Personal history of other infectious and parasitic diseases 04/28/2014    History of herpes simplex infection    Personal history of other specified conditions     History of vomiting    Personal history of other specified conditions     History of abnormal weight loss    Preglaucoma, unspecified, bilateral 01/16/2018    Glaucoma suspect of both eyes    Preglaucoma, unspecified, unspecified eye 05/30/2017    Glaucoma suspect    Preglaucoma, unspecified, unspecified eye     Glaucoma suspect    Primary open-angle glaucoma, bilateral, moderate stage 01/16/2018    Primary open angle glaucoma of both eyes, moderate stage    Rash and other nonspecific skin eruption     Skin rash    Rash and other nonspecific skin eruption 08/24/2015    Rash    Rash and other nonspecific skin eruption 04/28/2014    Rash       PAST SURGICAL HISTORY:  Past Surgical History:   Procedure Laterality Date    COLONOSCOPY  04/28/2014    Colonoscopy (Fiberoptic)    OTHER SURGICAL HISTORY  01/27/2014    Biopsy Of Liver    OTHER SURGICAL HISTORY  01/27/2014    Laryngoscopy (Diagnostic)        ALLERGIES:   Allergies   Allergen Reactions    Amoxicillin Unknown    Efavirenz Dizziness    Fenofibrate  Nausea Only    Ritonavir Unknown        MEDICATIONS:   [unfilled]     SOCIAL HISTORY:  Patient  reports that she has never smoked. She has never used smokeless tobacco. She reports that she does not drink alcohol and does not use drugs.   Social History     Socioeconomic History    Marital status: Single     Spouse name: Not on file    Number of children: Not on file    Years of education: Not on file    Highest education level: Not on file   Occupational History    Not on file   Tobacco Use    Smoking status: Never    Smokeless tobacco: Never   Substance and Sexual Activity    Alcohol use: Never    Drug use: Never    Sexual activity: Not on file   Other Topics Concern    Not on file   Social History Narrative    Not on file     Social Determinants of Health     Financial Resource Strain: Not on file   Food Insecurity: Not on file   Transportation Needs: Not on file   Physical Activity: Not on file   Stress: Not on file   Social Connections: Not on file   Intimate Partner Violence: Not on file   Housing Stability: Not on file       FAMILY HISTORY:  Family History   Problem Relation Name Age of Onset    Alzheimer's disease Mother      Cataracts Mother      Diabetes Mother      Breast cancer Mother      Diabetes Brother         REVIEW OF SYSTEMS:  Constitutional: Negative for fever and chills. Denies anorexia, weight loss.  Eyes: Negative for visual disturbance.   Respiratory: Negative for shortness of breath.    Cardiovascular: Negative for chest pain.   Gastrointestinal: Negative for nausea and vomiting.   Genitourinary: See interval history above.  Skin: Negative for rash.   Neurological: Negative for dizziness and numbness.   Psychiatric/Behavioral: Negative for confusion and decreased concentration.     PHYSICAL EXAM:  There were no vitals taken for this visit.  Constitutional: Patient appears well-developed and well-nourished. No distress.    Head: Normocephalic and atraumatic.    Neck: Normal range of motion.     Cardiovascular: Normal rate.    Pulmonary/Chest: Effort normal. No respiratory distress.     Musculoskeletal: Normal range of motion.    Neurological: Alert and oriented to person, place, and time.  Psychiatric: Normal mood and affect. Behavior is normal. Thought content normal.      PATHOLOGY REVIEW:      RADIOLOGY REVIEW:  [unfilled]    LABORATORY REVIEW:   [unfilled]         Assessment:      No diagnosis found.    Mishel Wagner is a 73 y.o. female presenting for follow up    She is doing well and has no complaints regarding her medications. She did request Climara if we can get it prescribed for her.      Plan:   Refilled estradiol patch  Refilled finasteride    Follow up    Scribe Attestation  By signing my name below, I, Nancy Garcia , Scribe   attest that this documentation has been prepared under the direction and in the presence of Penelope Escobedo MD MPH.

## 2023-12-08 ENCOUNTER — TELEMEDICINE (OUTPATIENT)
Dept: UROLOGY | Facility: CLINIC | Age: 73
End: 2023-12-08
Payer: COMMERCIAL

## 2023-12-08 DIAGNOSIS — F64.9 GENDER DYSPHORIA: Primary | ICD-10-CM

## 2023-12-08 PROCEDURE — 99213 OFFICE O/P EST LOW 20 MIN: CPT | Performed by: OBSTETRICS & GYNECOLOGY

## 2023-12-11 RX ORDER — FINASTERIDE 5 MG/1
5 TABLET, FILM COATED ORAL DAILY
Qty: 60 TABLET | Refills: 4 | Status: SHIPPED | OUTPATIENT
Start: 2023-12-11

## 2023-12-11 RX ORDER — ESTRADIOL 0.1 MG/D
1 PATCH TRANSDERMAL
Qty: 12 PATCH | Refills: 3 | Status: SHIPPED | OUTPATIENT
Start: 2023-12-11 | End: 2024-11-11

## 2023-12-15 DIAGNOSIS — B20 HUMAN IMMUNODEFICIENCY VIRUS (MULTI): Primary | ICD-10-CM

## 2023-12-18 ENCOUNTER — HOSPITAL ENCOUNTER (OUTPATIENT)
Dept: RADIOLOGY | Facility: HOSPITAL | Age: 73
Discharge: HOME | End: 2023-12-18
Payer: COMMERCIAL

## 2023-12-18 DIAGNOSIS — Z12.31 ENCOUNTER FOR SCREENING MAMMOGRAM FOR MALIGNANT NEOPLASM OF BREAST: ICD-10-CM

## 2023-12-18 PROCEDURE — 77063 BREAST TOMOSYNTHESIS BI: CPT

## 2023-12-18 PROCEDURE — 77067 SCR MAMMO BI INCL CAD: CPT | Mod: BILATERAL PROCEDURE | Performed by: STUDENT IN AN ORGANIZED HEALTH CARE EDUCATION/TRAINING PROGRAM

## 2023-12-18 PROCEDURE — 77063 BREAST TOMOSYNTHESIS BI: CPT | Mod: BILATERAL PROCEDURE | Performed by: STUDENT IN AN ORGANIZED HEALTH CARE EDUCATION/TRAINING PROGRAM

## 2023-12-26 ENCOUNTER — ANESTHESIA EVENT (OUTPATIENT)
Dept: OPERATING ROOM | Facility: CLINIC | Age: 73
End: 2023-12-26
Payer: COMMERCIAL

## 2023-12-28 ENCOUNTER — CLINICAL SUPPORT (OUTPATIENT)
Dept: IMMUNOLOGY | Facility: CLINIC | Age: 73
End: 2023-12-28
Payer: COMMERCIAL

## 2023-12-28 DIAGNOSIS — B20 HIV INFECTION, UNSPECIFIED SYMPTOM STATUS (MULTI): ICD-10-CM

## 2023-12-28 PROCEDURE — 2500000004 HC RX 250 GENERAL PHARMACY W/ HCPCS (ALT 636 FOR OP/ED): Performed by: INTERNAL MEDICINE

## 2023-12-28 RX ADMIN — CABOTEGRAVIR AND RILPIVIRINE 1 DOSE: KIT at 11:18

## 2023-12-28 NOTE — PROGRESS NOTES
Patient was here for cabenuva injections.  Medications delivered by patient's local pharmacy.  Rilpivirine administered to right gluteal muscle without incident.  Cabotegravir administered to left gluteal muscle without incident.  Patient tolerated both injections.

## 2024-01-02 ENCOUNTER — ANESTHESIA (OUTPATIENT)
Dept: OPERATING ROOM | Facility: CLINIC | Age: 74
End: 2024-01-02
Payer: COMMERCIAL

## 2024-01-02 ENCOUNTER — HOSPITAL ENCOUNTER (OUTPATIENT)
Facility: CLINIC | Age: 74
Setting detail: OUTPATIENT SURGERY
Discharge: HOME | End: 2024-01-02
Attending: OPHTHALMOLOGY | Admitting: OPHTHALMOLOGY
Payer: COMMERCIAL

## 2024-01-02 VITALS
HEIGHT: 65 IN | OXYGEN SATURATION: 100 % | BODY MASS INDEX: 25.31 KG/M2 | HEART RATE: 82 BPM | WEIGHT: 151.9 LBS | SYSTOLIC BLOOD PRESSURE: 140 MMHG | TEMPERATURE: 97.9 F | DIASTOLIC BLOOD PRESSURE: 71 MMHG | RESPIRATION RATE: 16 BRPM

## 2024-01-02 DIAGNOSIS — Z00.6 RESEARCH STUDY PATIENT: Primary | ICD-10-CM

## 2024-01-02 DIAGNOSIS — H26.9 CATARACT OF BOTH EYES, UNSPECIFIED CATARACT TYPE: ICD-10-CM

## 2024-01-02 DIAGNOSIS — H40.1131 PRIMARY OPEN ANGLE GLAUCOMA (POAG) OF BOTH EYES, MILD STAGE: Primary | ICD-10-CM

## 2024-01-02 PROCEDURE — 2500000001 HC RX 250 WO HCPCS SELF ADMINISTERED DRUGS (ALT 637 FOR MEDICARE OP): Mod: SE | Performed by: OPHTHALMOLOGY

## 2024-01-02 PROCEDURE — 3600000003 HC OR TIME - INITIAL BASE CHARGE - PROCEDURE LEVEL THREE: Performed by: OPHTHALMOLOGY

## 2024-01-02 PROCEDURE — A65820 PR RELIEVE INNER EYE PRESSURE

## 2024-01-02 PROCEDURE — 65820 GONIOTOMY: CPT | Performed by: OPHTHALMOLOGY

## 2024-01-02 PROCEDURE — 7100000009 HC PHASE TWO TIME - INITIAL BASE CHARGE: Performed by: OPHTHALMOLOGY

## 2024-01-02 PROCEDURE — C1780 LENS, INTRAOCULAR (NEW TECH): HCPCS | Performed by: OPHTHALMOLOGY

## 2024-01-02 PROCEDURE — 94760 N-INVAS EAR/PLS OXIMETRY 1: CPT

## 2024-01-02 PROCEDURE — 3600000008 HC OR TIME - EACH INCREMENTAL 1 MINUTE - PROCEDURE LEVEL THREE: Performed by: OPHTHALMOLOGY

## 2024-01-02 PROCEDURE — 2760000001 HC OR 276 NO HCPCS: Performed by: OPHTHALMOLOGY

## 2024-01-02 PROCEDURE — 99100 ANES PT EXTEME AGE<1 YR&>70: CPT | Performed by: ANESTHESIOLOGY

## 2024-01-02 PROCEDURE — 2720000007 HC OR 272 NO HCPCS: Performed by: OPHTHALMOLOGY

## 2024-01-02 PROCEDURE — 66984 XCAPSL CTRC RMVL W/O ECP: CPT | Performed by: OPHTHALMOLOGY

## 2024-01-02 PROCEDURE — A65820 PR RELIEVE INNER EYE PRESSURE: Performed by: ANESTHESIOLOGY

## 2024-01-02 PROCEDURE — 7100000010 HC PHASE TWO TIME - EACH INCREMENTAL 1 MINUTE: Performed by: OPHTHALMOLOGY

## 2024-01-02 PROCEDURE — 3700000002 HC GENERAL ANESTHESIA TIME - EACH INCREMENTAL 1 MINUTE: Performed by: OPHTHALMOLOGY

## 2024-01-02 PROCEDURE — 2500000004 HC RX 250 GENERAL PHARMACY W/ HCPCS (ALT 636 FOR OP/ED): Mod: SE

## 2024-01-02 PROCEDURE — 3700000001 HC GENERAL ANESTHESIA TIME - INITIAL BASE CHARGE: Performed by: OPHTHALMOLOGY

## 2024-01-02 DEVICE — ACRYSOF(R) IQ ASPHERIC NATURAL IOL, SINGLE-PIECE ACRYLIC FOLDABLE PCL, UV WITH BLUE LIGHTFILTER, 13.0MM LENGTH, 6.0MM ANTERIORASYMMETRIC BICONVEX OPTIC, PLANAR HAPTICS.
Type: IMPLANTABLE DEVICE | Site: EYE | Status: FUNCTIONAL
Brand: ACRYSOF®

## 2024-01-02 RX ORDER — MIDAZOLAM HYDROCHLORIDE 1 MG/ML
1 INJECTION INTRAMUSCULAR; INTRAVENOUS ONCE AS NEEDED
Status: CANCELLED | OUTPATIENT
Start: 2024-01-02

## 2024-01-02 RX ORDER — FENTANYL CITRATE 50 UG/ML
INJECTION, SOLUTION INTRAMUSCULAR; INTRAVENOUS AS NEEDED
Status: DISCONTINUED | OUTPATIENT
Start: 2024-01-02 | End: 2024-01-02

## 2024-01-02 RX ORDER — MIDAZOLAM HYDROCHLORIDE 1 MG/ML
INJECTION, SOLUTION INTRAMUSCULAR; INTRAVENOUS AS NEEDED
Status: DISCONTINUED | OUTPATIENT
Start: 2024-01-02 | End: 2024-01-02

## 2024-01-02 RX ORDER — TROPICAMIDE 10 MG/ML
1 SOLUTION/ DROPS OPHTHALMIC
Status: COMPLETED | OUTPATIENT
Start: 2024-01-02 | End: 2024-01-02

## 2024-01-02 RX ORDER — MOXIFLOXACIN 5 MG/ML
1 SOLUTION/ DROPS OPHTHALMIC
Status: COMPLETED | OUTPATIENT
Start: 2024-01-02 | End: 2024-01-02

## 2024-01-02 RX ORDER — LIDOCAINE HYDROCHLORIDE 10 MG/ML
0.1 INJECTION INFILTRATION; PERINEURAL ONCE
Status: CANCELLED | OUTPATIENT
Start: 2024-01-02 | End: 2024-01-02

## 2024-01-02 RX ORDER — PREDNISOLONE ACETATE 10 MG/ML
SUSPENSION/ DROPS OPHTHALMIC AS NEEDED
Status: DISCONTINUED | OUTPATIENT
Start: 2024-01-02 | End: 2024-01-02 | Stop reason: HOSPADM

## 2024-01-02 RX ORDER — METOCLOPRAMIDE HYDROCHLORIDE 5 MG/ML
10 INJECTION INTRAMUSCULAR; INTRAVENOUS ONCE AS NEEDED
Status: CANCELLED | OUTPATIENT
Start: 2024-01-02

## 2024-01-02 RX ORDER — DICLOFENAC SODIUM 1 MG/ML
SOLUTION/ DROPS OPHTHALMIC AS NEEDED
Status: DISCONTINUED | OUTPATIENT
Start: 2024-01-02 | End: 2024-01-02 | Stop reason: HOSPADM

## 2024-01-02 RX ORDER — SODIUM CHLORIDE, SODIUM LACTATE, POTASSIUM CHLORIDE, CALCIUM CHLORIDE 600; 310; 30; 20 MG/100ML; MG/100ML; MG/100ML; MG/100ML
100 INJECTION, SOLUTION INTRAVENOUS CONTINUOUS
Status: CANCELLED | OUTPATIENT
Start: 2024-01-02

## 2024-01-02 RX ORDER — OXYCODONE AND ACETAMINOPHEN 5; 325 MG/1; MG/1
1 TABLET ORAL EVERY 4 HOURS PRN
Status: CANCELLED | OUTPATIENT
Start: 2024-01-02

## 2024-01-02 RX ORDER — MOXIFLOXACIN 5 MG/ML
SOLUTION/ DROPS OPHTHALMIC AS NEEDED
Status: DISCONTINUED | OUTPATIENT
Start: 2024-01-02 | End: 2024-01-02 | Stop reason: HOSPADM

## 2024-01-02 RX ORDER — DICLOFENAC SODIUM 1 MG/ML
1 SOLUTION/ DROPS OPHTHALMIC
Status: COMPLETED | OUTPATIENT
Start: 2024-01-02 | End: 2024-01-02

## 2024-01-02 RX ORDER — PHENYLEPHRINE HYDROCHLORIDE 25 MG/ML
1 SOLUTION/ DROPS OPHTHALMIC
Status: COMPLETED | OUTPATIENT
Start: 2024-01-02 | End: 2024-01-02

## 2024-01-02 RX ORDER — CYCLOPENTOLATE HYDROCHLORIDE 10 MG/ML
1 SOLUTION/ DROPS OPHTHALMIC
Status: COMPLETED | OUTPATIENT
Start: 2024-01-02 | End: 2024-01-02

## 2024-01-02 RX ADMIN — TROPICAMIDE 1 DROP: 10 SOLUTION/ DROPS OPHTHALMIC at 07:45

## 2024-01-02 RX ADMIN — FENTANYL CITRATE 25 MCG: 50 INJECTION, SOLUTION INTRAMUSCULAR; INTRAVENOUS at 09:14

## 2024-01-02 RX ADMIN — PHENYLEPHRINE HYDROCHLORIDE 1 DROP: 25 SOLUTION/ DROPS OPHTHALMIC at 07:55

## 2024-01-02 RX ADMIN — FENTANYL CITRATE 25 MCG: 50 INJECTION, SOLUTION INTRAMUSCULAR; INTRAVENOUS at 08:52

## 2024-01-02 RX ADMIN — PHENYLEPHRINE HYDROCHLORIDE 1 DROP: 25 SOLUTION/ DROPS OPHTHALMIC at 07:50

## 2024-01-02 RX ADMIN — MIDAZOLAM 1 MG: 1 INJECTION INTRAMUSCULAR; INTRAVENOUS at 08:44

## 2024-01-02 RX ADMIN — PHENYLEPHRINE HYDROCHLORIDE 1 DROP: 25 SOLUTION/ DROPS OPHTHALMIC at 07:45

## 2024-01-02 RX ADMIN — CYCLOPENTOLATE HYDROCHLORIDE 1 DROP: 10 SOLUTION/ DROPS OPHTHALMIC at 07:55

## 2024-01-02 RX ADMIN — CYCLOPENTOLATE HYDROCHLORIDE 1 DROP: 10 SOLUTION/ DROPS OPHTHALMIC at 07:50

## 2024-01-02 RX ADMIN — DICLOFENAC SODIUM 1 DROP: 1 SOLUTION OPHTHALMIC at 07:55

## 2024-01-02 RX ADMIN — DICLOFENAC SODIUM 1 DROP: 1 SOLUTION OPHTHALMIC at 07:50

## 2024-01-02 RX ADMIN — TROPICAMIDE 1 DROP: 10 SOLUTION/ DROPS OPHTHALMIC at 07:55

## 2024-01-02 RX ADMIN — FENTANYL CITRATE 25 MCG: 50 INJECTION, SOLUTION INTRAMUSCULAR; INTRAVENOUS at 08:50

## 2024-01-02 RX ADMIN — MOXIFLOXACIN 1 DROP: 5 SOLUTION/ DROPS OPHTHALMIC at 07:55

## 2024-01-02 RX ADMIN — MOXIFLOXACIN 1 DROP: 5 SOLUTION/ DROPS OPHTHALMIC at 07:50

## 2024-01-02 RX ADMIN — CYCLOPENTOLATE HYDROCHLORIDE 1 DROP: 10 SOLUTION/ DROPS OPHTHALMIC at 07:45

## 2024-01-02 RX ADMIN — DICLOFENAC SODIUM 1 DROP: 1 SOLUTION OPHTHALMIC at 07:45

## 2024-01-02 RX ADMIN — TROPICAMIDE 1 DROP: 10 SOLUTION/ DROPS OPHTHALMIC at 07:50

## 2024-01-02 RX ADMIN — MOXIFLOXACIN 1 DROP: 5 SOLUTION/ DROPS OPHTHALMIC at 07:45

## 2024-01-02 ASSESSMENT — ENCOUNTER SYMPTOMS
RESPIRATORY NEGATIVE: 1
CONSTITUTIONAL NEGATIVE: 1
CARDIOVASCULAR NEGATIVE: 1

## 2024-01-02 ASSESSMENT — PAIN SCALES - GENERAL
PAINLEVEL_OUTOF10: 0 - NO PAIN
PAINLEVEL_OUTOF10: 0 - NO PAIN
PAIN_LEVEL: 0

## 2024-01-02 ASSESSMENT — COLUMBIA-SUICIDE SEVERITY RATING SCALE - C-SSRS
6. HAVE YOU EVER DONE ANYTHING, STARTED TO DO ANYTHING, OR PREPARED TO DO ANYTHING TO END YOUR LIFE?: NO
2. HAVE YOU ACTUALLY HAD ANY THOUGHTS OF KILLING YOURSELF?: NO
1. IN THE PAST MONTH, HAVE YOU WISHED YOU WERE DEAD OR WISHED YOU COULD GO TO SLEEP AND NOT WAKE UP?: NO

## 2024-01-02 ASSESSMENT — PAIN - FUNCTIONAL ASSESSMENT
PAIN_FUNCTIONAL_ASSESSMENT: 0-10
PAIN_FUNCTIONAL_ASSESSMENT: 0-10

## 2024-01-02 NOTE — ANESTHESIA PREPROCEDURE EVALUATION
Patient: Mishel Wagner    Procedure Information       Date/Time: 01/02/24 0830    Procedures:       Extraction Cataract with Insertion Intraocular Lens (Right)      Goniotomy (Right)    Location: [facility] / [facility]    Surgeons: Radha Coombs MD            Relevant Problems   Anesthesia (within normal limits)      Cardiovascular   (+) Hyperlipidemia      Endocrine   (+) Central obesity   (+) Goiter      GI   (+) GERD (gastroesophageal reflux disease)      /Renal   (+) Hepatitis C, chronic (CMS/HCC)   (+) Inflammatory liver disease      Neuro/Psych (within normal limits)      Pulmonary (within normal limits)      GI/Hepatic   (+) Hepatitis C, chronic (CMS/HCC)   (+) Inflammatory liver disease      Hematology (within normal limits)      Musculoskeletal (within normal limits)      Eyes, Ears, Nose, and Throat   (+) Primary open angle glaucoma (POAG) of both eyes, mild stage   (+) Primary open angle glaucoma of left eye, moderate stage   (+) Primary open angle glaucoma of right eye, moderate stage   (+) Primary open-angle glaucoma, bilateral, mild stage   (+) Sensorineural hearing loss (SNHL) of both ears      Infectious Disease   (+) Acquired immunodeficiency syndrome (CMS/HCC)   (+) Genital HSV   (+) HIV disease (CMS/HCC)   (+) Hepatitis C, chronic (CMS/HCC)       Clinical information reviewed:   Tobacco  Allergies  Meds   Med Hx  Surg Hx   Fam Hx  Soc Hx        NPO Detail:  NPO/Void Status  Date of Last Liquid: 01/01/24  Time of Last Liquid: 2300  Date of Last Solid: 01/01/24  Time of Last Solid: 2300  Last Intake Type: Light meal  Time of Last Void: 0747         Physical Exam    Airway  Mallampati: II  TM distance: >3 FB  Neck ROM: full     Cardiovascular - normal exam     Dental - normal exam     Pulmonary - normal exam     Abdominal - normal exam             Anesthesia Plan    ASA 3     MAC     intravenous induction   Anesthetic plan and risks discussed with patient.    Plan  discussed with ALLEN.

## 2024-01-02 NOTE — H&P
History Of Present Illness  Mishel Wagner is a 73 y.o. female with cataract and glaucoma of right eye presenting for CEIOL with ab interno canaloplasty of the right eye, to be performed 1/2/24.     Past Medical History  Past Medical History:   Diagnosis Date    Combined forms of age-related cataract, left eye 10/25/2018    Combined form of age-related cataract, left eye    Combined forms of age-related cataract, right eye 10/25/2018    Combined form of age-related cataract, right eye    Cortical age-related cataract, unspecified eye 06/28/2016    Cataract cortical, senile    Disorder of ligament, unspecified wrist     Disorder of ligament of wrist    Encounter for screening for malignant neoplasm of colon 08/13/2018    Colon cancer screening    Furuncle of back (any part, except buttock) 11/01/2018    Boil, back    Furuncle, unspecified     Boil    Furuncle, unspecified 08/12/2022    Boil    Human immunodeficiency virus (HIV) disease (CMS/[facility]) 01/16/2018    HIV infection    Human immunodeficiency virus (HIV) disease (CMS/[facility]) 01/16/2018    HIV disease    Hyperlipidemia     Hypermetropia, bilateral 10/24/2022    Hyperopia of both eyes with astigmatism and presbyopia    Hypermetropia, unspecified eye 09/18/2018    Hyperopia with astigmatism and presbyopia    Melena 02/16/2015    Blood in stool    Nicotine dependence, cigarettes, uncomplicated 02/04/2019    Cigarette nicotine dependence without complication    Other abnormal and inconclusive findings on diagnostic imaging of breast 10/21/2015    Abnormal finding on mammography    Other conditions influencing health status     Epicondylitis    Other conditions influencing health status 06/25/2021    History of cough    Pain in left shoulder 10/25/2016    Left shoulder pain    Pain in left shoulder 11/21/2016    Left shoulder pain, unspecified chronicity    Pain in right shoulder 11/21/2016    Right shoulder pain    Person consulting for explanation of examination or  test findings     Visit for review of DEXA scan    Personal history of infections of the central nervous system     History of meningitis    Personal history of nicotine dependence 08/13/2018    History of nicotine dependence    Personal history of other diseases of the digestive system 04/28/2014    History of constipation    Personal history of other diseases of the musculoskeletal system and connective tissue 05/30/2017    History of low back pain    Personal history of other infectious and parasitic diseases     History of syphilis    Personal history of other infectious and parasitic diseases 04/28/2014    History of herpes simplex infection    Personal history of other specified conditions     History of vomiting    Personal history of other specified conditions     History of abnormal weight loss    Preglaucoma, unspecified, bilateral 01/16/2018    Glaucoma suspect of both eyes    Preglaucoma, unspecified, unspecified eye 05/30/2017    Glaucoma suspect    Preglaucoma, unspecified, unspecified eye     Glaucoma suspect    Primary open-angle glaucoma, bilateral, moderate stage 01/16/2018    Primary open angle glaucoma of both eyes, moderate stage    Rash and other nonspecific skin eruption     Skin rash    Rash and other nonspecific skin eruption 08/24/2015    Rash    Rash and other nonspecific skin eruption 04/28/2014    Rash       Surgical History  Past Surgical History:   Procedure Laterality Date    COLONOSCOPY  04/28/2014    Colonoscopy (Fiberoptic)    OTHER SURGICAL HISTORY  01/27/2014    Biopsy Of Liver    OTHER SURGICAL HISTORY  01/27/2014    Laryngoscopy (Diagnostic)        Social History  She reports that she has never smoked. She has never used smokeless tobacco. She reports that she does not drink alcohol and does not use drugs.    Family History  Family History   Problem Relation Name Age of Onset    Alzheimer's disease Mother      Cataracts Mother      Diabetes Mother      Breast cancer Mother           Unknow age of onset    Diabetes Brother          Allergies  Amoxicillin, Efavirenz, Fenofibrate, and Ritonavir    Review of Systems   Constitutional: Negative.    Respiratory: Negative.     Cardiovascular: Negative.         Physical Exam  Constitutional:       Appearance: Normal appearance.   Eyes:      Comments: Cataract right eye   Cardiovascular:      Comments: Warm and well perfused  Pulmonary:      Effort: Pulmonary effort is normal. No respiratory distress.   Neurological:      Mental Status: She is alert.   Psychiatric:         Mood and Affect: Mood normal.         Behavior: Behavior normal.          Assessment/Plan   Active Problems:    Cataract of both eyes    Primary open angle glaucoma (POAG) of both eyes, mild stage    Mishel Wagner is a 72 y/o F with cataract and glaucoma of right eye presenting for CEIOL with ab interno canaloplasty of the right eye, to be performed 1/2/24.     Krystian Lopez MD

## 2024-01-02 NOTE — ANESTHESIA POSTPROCEDURE EVALUATION
Patient: Mishel Wagner    Procedure Summary       Date: 01/02/24 [place] [facility] [place] [facility] [place] [facility] [place] [facility] [place]    Anesthesia Start: 0844 Anesthesia Stop: 0929    Procedures:       Extraction Cataract with Insertion Intraocular Lens (Right: Eye)      Goniotomy (Right: Eye) Diagnosis:       Cataract of both eyes, unspecified cataract type      Primary open angle glaucoma (POAG) of both eyes, mild stage      (Cataract of both eyes, unspecified cataract type [H26.9])      (Primary open angle glaucoma (POAG) of both eyes, mild stage [H40.1131])    Surgeons: Radha Coombs MD Responsible Provider: Félix Burns MD    Anesthesia Type: MAC ASA Status: 3            Anesthesia Type: MAC    Vitals Value Taken Time   /71 01/02/24 0926   Temp 36.6 °C (97.9 °F) 01/02/24 0926   Pulse 82 01/02/24 0926   Resp 16 01/02/24 0926   SpO2 100 % 01/02/24 0926       Anesthesia Post Evaluation    Patient location during evaluation: PACU  Patient participation: complete - patient participated  Level of consciousness: awake  Pain score: 0  Pain management: adequate  Airway patency: patent  Cardiovascular status: acceptable  Respiratory status: acceptable  Hydration status: acceptable  Postoperative Nausea and Vomiting: none  Comments: No PONV        There were no known notable events for this encounter.

## 2024-01-02 NOTE — DISCHARGE INSTRUCTIONS
Dr. Duran's discharge instruction sheet reviewed with patient. She verbalized understanding, questions answered.   40

## 2024-01-02 NOTE — OP NOTE
Cataract Phacoemulsification w/I0L (R) Operative Note     Date: 2024  OR Location: [facility] OR    Name: Mishel Wagner, : 1950, Age: 73 y.o., MRN: [id number], Sex: female    Diagnosis  Pre-op Diagnosis     * Cataract of both eyes, unspecified cataract type [H26.9]     * Primary open angle glaucoma (POAG) of both eyes, mild stage [H40.1131] Post-op Diagnosis     * Cataract of both eyes, unspecified cataract type [H26.9]     * Primary open angle glaucoma (POAG) of both eyes, mild stage [H40.1131]     Procedures  Extraction Cataract with Insertion Intraocular Lens  02174 - WI XCAPSL CTRC RMVL INSJ IO LENS PROSTH W/O ECP    Goniotomy  15497 - WI GONIOTOMY  , RIGHT    Surgeons   Panel 1:     * Radha Coombs - Primary  Panel 2:     * Radha Coombs - Primary    Resident/Fellow/Other Assistant:  John    Procedure Summary  Anesthesia: Monitor Anesthesia Care  ASA: III  Anesthesia Staff:   Anesthesiologist: Félix Burns MD  C-AA: ALLEN Bundy  Estimated Blood Loss: none        Specimen: No specimens collected     Staff:   Circulator: Miguelina Brush RN  Scrub Person: Goldie Estrella RN          Findings: as expected    Indications: Mishel Wagner is an 73 y.o. female who is having surgery for Cataract of both eyes, unspecified cataract type [H26.9]  Primary open angle glaucoma (POAG) of both eyes, mild stage [H40.1131]. RIGHT    The patient was seen in the preoperative area. The risks, benefits, complications, treatment options, non-operative alternatives, expected recovery and outcomes were discussed with the patient. The possibilities of reaction to medication, pulmonary aspiration, injury to surrounding structures, bleeding, recurrent infection, the need for additional procedures, failure to diagnose a condition, and creating a complication or operation were discussed with the patient. The patient concurred with the proposed plan, giving informed consent.  The site of surgery was properly  noted/marked if necessary per policy.     Procedure Details:   The patient was escorted to the operating room where a time out was completed   and monitored anesthesia care was begun. The patient was prepped and draped in   the usual sterile fashion for intraocular surgery. A lid speculum was placed   and the operating microscope was positioned. A paracentesis was made to the   left of the planned cataract incision with a 1mm blade.  Shugarcaine followed by Viscoat was used to   replace the aqueous humor. A temporal clear corneal wound was made with a 2.4   mm keratome, extending 2 mm into clear cornea before entering the anterior   chamber. A continuous curvilinear  capsulorhexis of approximately 5 mm in   diameter was performed. Hydrodissection was performed using a canula. The   pre-chopper was used to crack the nucleus into smaller pieces which were then   removed with the assistance of a horizontal chopper and phaco hand piece.   Residual cortex was removed with IA. ProVisc was used to inflate the capsular   bag. The lens implant  nabeel SN60WF 19.5 diopter intraocular lens. The lens was   inserted into the capsular bag and rotated to the proper position with a   nicolette. The patient was then positioned for direct gonioscopy. A hour 3 clock hour nasal goniotomy was made.   The microcatheter was primed and then thread for 360 degrees and then retracted whilst   injecting viscoelastic approximately 8-10 clicks hours per quadrant. the device   was then removed an dthe patietn was repositioned supine. Residual   viscoelastic was removed from the eye with the irrigation/aspiration   instrument. The wounds were checked and found to be watertight. The anterior   chamber was at physiologic depth and pressure. The lid speculum was removed and   a patch and shield were taped in place. The patient tolerated the procedure   well, and there were no complications.   Complications:  None   Disposition: stable          Attending  Attestation: I was present and scrubbed for the entire procedure    Radha Coombs  Phone Number: [phone]

## 2024-01-03 ENCOUNTER — OFFICE VISIT (OUTPATIENT)
Dept: OPHTHALMOLOGY | Facility: CLINIC | Age: 74
End: 2024-01-03
Payer: COMMERCIAL

## 2024-01-03 DIAGNOSIS — Z96.1 PSEUDOPHAKIA: ICD-10-CM

## 2024-01-03 DIAGNOSIS — H40.1131 PRIMARY OPEN ANGLE GLAUCOMA (POAG) OF BOTH EYES, MILD STAGE: Primary | ICD-10-CM

## 2024-01-03 PROCEDURE — 99024 POSTOP FOLLOW-UP VISIT: CPT | Performed by: OPHTHALMOLOGY

## 2024-01-03 ASSESSMENT — ENCOUNTER SYMPTOMS
ENDOCRINE NEGATIVE: 0
CONSTITUTIONAL NEGATIVE: 0
HEMATOLOGIC/LYMPHATIC NEGATIVE: 0
CARDIOVASCULAR NEGATIVE: 0
ALLERGIC/IMMUNOLOGIC NEGATIVE: 0
RESPIRATORY NEGATIVE: 0
EYES NEGATIVE: 0
PSYCHIATRIC NEGATIVE: 0
GASTROINTESTINAL NEGATIVE: 0
MUSCULOSKELETAL NEGATIVE: 0
NEUROLOGICAL NEGATIVE: 0

## 2024-01-03 ASSESSMENT — SLIT LAMP EXAM - LIDS
COMMENTS: NORMAL
COMMENTS: NORMAL

## 2024-01-03 ASSESSMENT — CONF VISUAL FIELD
OS_SUPERIOR_TEMPORAL_RESTRICTION: 0
OD_INFERIOR_TEMPORAL_RESTRICTION: 0
OS_SUPERIOR_NASAL_RESTRICTION: 0
OD_INFERIOR_NASAL_RESTRICTION: 0
OD_SUPERIOR_NASAL_RESTRICTION: 0
OS_NORMAL: 1
OD_SUPERIOR_TEMPORAL_RESTRICTION: 0
OS_INFERIOR_NASAL_RESTRICTION: 0
OD_NORMAL: 1
OS_INFERIOR_TEMPORAL_RESTRICTION: 0

## 2024-01-03 ASSESSMENT — EXTERNAL EXAM - RIGHT EYE: OD_EXAM: NORMAL

## 2024-01-03 ASSESSMENT — EXTERNAL EXAM - LEFT EYE: OS_EXAM: NORMAL

## 2024-01-03 ASSESSMENT — PACHYMETRY
OS_CT(UM): 570
OD_CT(UM): 570

## 2024-01-03 ASSESSMENT — VISUAL ACUITY
OD_PH_SC: 20/25
OD_PH_SC+: -3
METHOD: SNELLEN - LINEAR
OD_SC: 20/70

## 2024-01-03 ASSESSMENT — CUP TO DISC RATIO
OD_RATIO: 0.75
OS_RATIO: 0.7

## 2024-01-03 ASSESSMENT — TONOMETRY
OD_IOP_MMHG: 14
IOP_METHOD: GOLDMANN APPLANATION

## 2024-01-03 NOTE — PROGRESS NOTES
POD1 s/p ce/iol/abic, right   doing well.   Continue pred/dicl/moxi QID  Discontinue glaucoma drops  shield and activity restrictions.  RTC 1 week, sooner prn

## 2024-01-08 ENCOUNTER — OFFICE VISIT (OUTPATIENT)
Dept: OPHTHALMOLOGY | Facility: CLINIC | Age: 74
End: 2024-01-08
Payer: COMMERCIAL

## 2024-01-08 DIAGNOSIS — Z96.1 PSEUDOPHAKIA: Primary | ICD-10-CM

## 2024-01-08 PROCEDURE — 99024 POSTOP FOLLOW-UP VISIT: CPT | Performed by: OPHTHALMOLOGY

## 2024-01-08 ASSESSMENT — SLIT LAMP EXAM - LIDS
COMMENTS: NORMAL
COMMENTS: NORMAL

## 2024-01-08 ASSESSMENT — TONOMETRY
OD_IOP_MMHG: 14
OS_IOP_MMHG: 13
IOP_METHOD: TONOPEN

## 2024-01-08 ASSESSMENT — EXTERNAL EXAM - LEFT EYE: OS_EXAM: NORMAL

## 2024-01-08 ASSESSMENT — PACHYMETRY
OS_CT(UM): 570
OD_CT(UM): 570

## 2024-01-08 ASSESSMENT — VISUAL ACUITY
METHOD: SNELLEN - LINEAR
OD_SC: 20/30-2
OS_SC: 20/60-1
OS_PH_SC: 20/30-2

## 2024-01-08 ASSESSMENT — EXTERNAL EXAM - RIGHT EYE: OD_EXAM: NORMAL

## 2024-01-08 NOTE — PROGRESS NOTES
POw1 s/p ce/iol/abic, right   doing well.   taper pred/  Stop moxi QID  Discontinue glaucoma drops  discontinue shield and activity restrictions.  Proceed with OS

## 2024-01-09 ENCOUNTER — APPOINTMENT (OUTPATIENT)
Dept: LAB | Facility: LAB | Age: 74
End: 2024-01-09
Payer: COMMERCIAL

## 2024-01-09 ENCOUNTER — ANESTHESIA EVENT (OUTPATIENT)
Dept: OPERATING ROOM | Facility: CLINIC | Age: 74
End: 2024-01-09
Payer: COMMERCIAL

## 2024-01-10 DIAGNOSIS — Z98.42 CATARACT EXTRACTION STATUS OF LEFT EYE: Primary | ICD-10-CM

## 2024-01-10 RX ORDER — MOXIFLOXACIN 5 MG/ML
1 SOLUTION/ DROPS OPHTHALMIC 4 TIMES DAILY
COMMUNITY
End: 2024-01-10 | Stop reason: SDUPTHER

## 2024-01-10 RX ORDER — MOXIFLOXACIN 5 MG/ML
1 SOLUTION/ DROPS OPHTHALMIC 4 TIMES DAILY
Qty: 3 ML | Refills: 0 | Status: SHIPPED | OUTPATIENT
Start: 2024-01-10

## 2024-01-10 RX ORDER — PREDNISOLONE ACETATE 10 MG/ML
1 SUSPENSION/ DROPS OPHTHALMIC 4 TIMES DAILY
COMMUNITY
End: 2024-01-10 | Stop reason: SDUPTHER

## 2024-01-10 RX ORDER — PREDNISOLONE ACETATE 10 MG/ML
1 SUSPENSION/ DROPS OPHTHALMIC 4 TIMES DAILY
Qty: 5 ML | Refills: 0 | Status: SHIPPED | OUTPATIENT
Start: 2024-01-10

## 2024-01-11 PROCEDURE — RXMED WILLOW AMBULATORY MEDICATION CHARGE

## 2024-01-15 ENCOUNTER — LAB (OUTPATIENT)
Dept: LAB | Facility: LAB | Age: 74
End: 2024-01-15
Payer: COMMERCIAL

## 2024-01-15 ENCOUNTER — PHARMACY VISIT (OUTPATIENT)
Dept: PHARMACY | Facility: CLINIC | Age: 74
End: 2024-01-15
Payer: MEDICAID

## 2024-01-15 ENCOUNTER — HOSPITAL ENCOUNTER (OUTPATIENT)
Dept: CARDIOLOGY | Facility: HOSPITAL | Age: 74
Discharge: HOME | End: 2024-01-15
Payer: COMMERCIAL

## 2024-01-15 DIAGNOSIS — R94.31 ABNORMAL ELECTROCARDIOGRAM (ECG) (EKG): ICD-10-CM

## 2024-01-15 DIAGNOSIS — Z13.6 ISCHEMIC HEART DISEASE SCREEN: ICD-10-CM

## 2024-01-15 DIAGNOSIS — Z00.6 RESEARCH STUDY PATIENT: Primary | ICD-10-CM

## 2024-01-15 DIAGNOSIS — Z00.6 RESEARCH STUDY PATIENT: ICD-10-CM

## 2024-01-15 LAB
BASOPHILS # BLD AUTO: 0.05 X10*3/UL (ref 0–0.1)
BASOPHILS NFR BLD AUTO: 0.6 %
EOSINOPHIL # BLD AUTO: 0.04 X10*3/UL (ref 0–0.4)
EOSINOPHIL NFR BLD AUTO: 0.4 %
ERYTHROCYTE [DISTWIDTH] IN BLOOD BY AUTOMATED COUNT: 16 % (ref 11.5–14.5)
HCT VFR BLD AUTO: 33.3 % (ref 36–46)
HGB BLD-MCNC: 10.6 G/DL (ref 12–16)
IMM GRANULOCYTES # BLD AUTO: 0.03 X10*3/UL (ref 0–0.5)
IMM GRANULOCYTES NFR BLD AUTO: 0.3 % (ref 0–0.9)
INR PPP: 1 (ref 0.9–1.1)
LYMPHOCYTES # BLD AUTO: 2.95 X10*3/UL (ref 0.8–3)
LYMPHOCYTES NFR BLD AUTO: 32.6 %
MCH RBC QN AUTO: 26.2 PG (ref 26–34)
MCHC RBC AUTO-ENTMCNC: 31.8 G/DL (ref 32–36)
MCV RBC AUTO: 82 FL (ref 80–100)
MONOCYTES # BLD AUTO: 0.53 X10*3/UL (ref 0.05–0.8)
MONOCYTES NFR BLD AUTO: 5.9 %
NEUTROPHILS # BLD AUTO: 5.45 X10*3/UL (ref 1.6–5.5)
NEUTROPHILS NFR BLD AUTO: 60.2 %
NRBC BLD-RTO: 0 /100 WBCS (ref 0–0)
PLATELET # BLD AUTO: 348 X10*3/UL (ref 150–450)
PROTHROMBIN TIME: 11.3 SECONDS (ref 9.8–12.8)
RBC # BLD AUTO: 4.05 X10*6/UL (ref 4–5.2)
WBC # BLD AUTO: 9.1 X10*3/UL (ref 4.4–11.3)

## 2024-01-15 PROCEDURE — 93005 ELECTROCARDIOGRAM TRACING: CPT

## 2024-01-15 PROCEDURE — 85610 PROTHROMBIN TIME: CPT

## 2024-01-15 PROCEDURE — 36415 COLL VENOUS BLD VENIPUNCTURE: CPT

## 2024-01-15 PROCEDURE — 85025 COMPLETE CBC W/AUTO DIFF WBC: CPT

## 2024-01-15 PROCEDURE — 93010 ELECTROCARDIOGRAM REPORT: CPT | Performed by: INTERNAL MEDICINE

## 2024-01-15 RX ORDER — ACETAMINOPHEN 325 MG/1
650 TABLET ORAL EVERY 4 HOURS PRN
Status: CANCELLED | OUTPATIENT
Start: 2024-01-15

## 2024-01-15 RX ORDER — LIDOCAINE HYDROCHLORIDE 10 MG/ML
0.1 INJECTION INFILTRATION; PERINEURAL ONCE
Status: CANCELLED | OUTPATIENT
Start: 2024-01-15 | End: 2024-01-15

## 2024-01-15 RX ORDER — ONDANSETRON HYDROCHLORIDE 2 MG/ML
4 INJECTION, SOLUTION INTRAVENOUS ONCE AS NEEDED
Status: CANCELLED | OUTPATIENT
Start: 2024-01-15

## 2024-01-15 RX ORDER — OXYCODONE HYDROCHLORIDE 5 MG/1
5 TABLET ORAL EVERY 4 HOURS PRN
Status: CANCELLED | OUTPATIENT
Start: 2024-01-15

## 2024-01-15 NOTE — PROGRESS NOTES
Patient had EKG done in research study.  RN received orders from Dr. Caballero for exercise stress test and refer to cardiology.  RN to call patient with MD orders.

## 2024-01-16 ENCOUNTER — ANESTHESIA (OUTPATIENT)
Dept: OPERATING ROOM | Facility: CLINIC | Age: 74
End: 2024-01-16
Payer: COMMERCIAL

## 2024-01-16 ENCOUNTER — HOSPITAL ENCOUNTER (OUTPATIENT)
Facility: CLINIC | Age: 74
Setting detail: OUTPATIENT SURGERY
Discharge: HOME | End: 2024-01-16
Attending: OPHTHALMOLOGY | Admitting: OPHTHALMOLOGY
Payer: COMMERCIAL

## 2024-01-16 ENCOUNTER — DOCUMENTATION (OUTPATIENT)
Dept: IMMUNOLOGY | Facility: CLINIC | Age: 74
End: 2024-01-16

## 2024-01-16 VITALS
DIASTOLIC BLOOD PRESSURE: 58 MMHG | RESPIRATION RATE: 16 BRPM | SYSTOLIC BLOOD PRESSURE: 105 MMHG | HEIGHT: 65 IN | HEART RATE: 80 BPM | BODY MASS INDEX: 24.16 KG/M2 | OXYGEN SATURATION: 100 % | WEIGHT: 145 LBS | TEMPERATURE: 97 F

## 2024-01-16 DIAGNOSIS — H40.1131 PRIMARY OPEN ANGLE GLAUCOMA (POAG) OF BOTH EYES, MILD STAGE: ICD-10-CM

## 2024-01-16 DIAGNOSIS — H26.9 CATARACT OF BOTH EYES, UNSPECIFIED CATARACT TYPE: Primary | ICD-10-CM

## 2024-01-16 LAB
ATRIAL RATE: 85 BPM
P AXIS: 71 DEGREES
P OFFSET: 205 MS
P ONSET: 146 MS
PR INTERVAL: 140 MS
Q ONSET: 216 MS
QRS COUNT: 14 BEATS
QRS DURATION: 78 MS
QT INTERVAL: 392 MS
QTC CALCULATION(BAZETT): 466 MS
QTC FREDERICIA: 440 MS
R AXIS: 65 DEGREES
T AXIS: 234 DEGREES
T OFFSET: 412 MS
VENTRICULAR RATE: 85 BPM

## 2024-01-16 PROCEDURE — A66984 PR REMV CATARACT EXTRACAP,INSERT LENS: Performed by: STUDENT IN AN ORGANIZED HEALTH CARE EDUCATION/TRAINING PROGRAM

## 2024-01-16 PROCEDURE — 2500000004 HC RX 250 GENERAL PHARMACY W/ HCPCS (ALT 636 FOR OP/ED): Mod: SE

## 2024-01-16 PROCEDURE — 3700000002 HC GENERAL ANESTHESIA TIME - EACH INCREMENTAL 1 MINUTE: Performed by: OPHTHALMOLOGY

## 2024-01-16 PROCEDURE — 2500000001 HC RX 250 WO HCPCS SELF ADMINISTERED DRUGS (ALT 637 FOR MEDICARE OP): Mod: SE | Performed by: OPHTHALMOLOGY

## 2024-01-16 PROCEDURE — 2500000004 HC RX 250 GENERAL PHARMACY W/ HCPCS (ALT 636 FOR OP/ED): Mod: SE | Performed by: OPHTHALMOLOGY

## 2024-01-16 PROCEDURE — 65820 GONIOTOMY: CPT | Performed by: OPHTHALMOLOGY

## 2024-01-16 PROCEDURE — 7100000009 HC PHASE TWO TIME - INITIAL BASE CHARGE: Performed by: OPHTHALMOLOGY

## 2024-01-16 PROCEDURE — 94760 N-INVAS EAR/PLS OXIMETRY 1: CPT

## 2024-01-16 PROCEDURE — A66984 PR REMV CATARACT EXTRACAP,INSERT LENS

## 2024-01-16 PROCEDURE — 99100 ANES PT EXTEME AGE<1 YR&>70: CPT | Performed by: STUDENT IN AN ORGANIZED HEALTH CARE EDUCATION/TRAINING PROGRAM

## 2024-01-16 PROCEDURE — 66984 XCAPSL CTRC RMVL W/O ECP: CPT | Performed by: OPHTHALMOLOGY

## 2024-01-16 PROCEDURE — 3600000003 HC OR TIME - INITIAL BASE CHARGE - PROCEDURE LEVEL THREE: Performed by: OPHTHALMOLOGY

## 2024-01-16 PROCEDURE — 2720000007 HC OR 272 NO HCPCS: Performed by: OPHTHALMOLOGY

## 2024-01-16 PROCEDURE — 2760000001 HC OR 276 NO HCPCS: Performed by: OPHTHALMOLOGY

## 2024-01-16 PROCEDURE — 3600000008 HC OR TIME - EACH INCREMENTAL 1 MINUTE - PROCEDURE LEVEL THREE: Performed by: OPHTHALMOLOGY

## 2024-01-16 PROCEDURE — 3700000001 HC GENERAL ANESTHESIA TIME - INITIAL BASE CHARGE: Performed by: OPHTHALMOLOGY

## 2024-01-16 PROCEDURE — C1780 LENS, INTRAOCULAR (NEW TECH): HCPCS | Performed by: OPHTHALMOLOGY

## 2024-01-16 PROCEDURE — 7100000010 HC PHASE TWO TIME - EACH INCREMENTAL 1 MINUTE: Performed by: OPHTHALMOLOGY

## 2024-01-16 DEVICE — ACRYSOF(R) IQ ASPHERIC NATURAL IOL, SINGLE-PIECE ACRYLIC FOLDABLE PCL, UV WITH BLUE LIGHTFILTER, 13.0MM LENGTH, 6.0MM ANTERIORASYMMETRIC BICONVEX OPTIC, PLANAR HAPTICS.
Type: IMPLANTABLE DEVICE | Site: EYE | Status: FUNCTIONAL
Brand: ACRYSOF®

## 2024-01-16 DEVICE — CANALOPLASTY MICROCATHETER KITKIT CONTENTS:(1) CANALOPLASTY MICROCATHETER ITRACK 250A(1) OPTHALMIC VISCOINJECTOR
Type: IMPLANTABLE DEVICE | Site: EYE | Status: FUNCTIONAL
Brand: ITRACK 250A

## 2024-01-16 RX ORDER — PHENYLEPHRINE HYDROCHLORIDE 25 MG/ML
1 SOLUTION/ DROPS OPHTHALMIC
Status: COMPLETED | OUTPATIENT
Start: 2024-01-16 | End: 2024-01-16

## 2024-01-16 RX ORDER — MOXIFLOXACIN 5 MG/ML
1 SOLUTION/ DROPS OPHTHALMIC
Status: COMPLETED | OUTPATIENT
Start: 2024-01-16 | End: 2024-01-16

## 2024-01-16 RX ORDER — SODIUM CHLORIDE, SODIUM LACTATE, POTASSIUM CHLORIDE, CALCIUM CHLORIDE 600; 310; 30; 20 MG/100ML; MG/100ML; MG/100ML; MG/100ML
100 INJECTION, SOLUTION INTRAVENOUS CONTINUOUS
Status: DISCONTINUED | OUTPATIENT
Start: 2024-01-16 | End: 2024-01-16 | Stop reason: HOSPADM

## 2024-01-16 RX ORDER — PREDNISOLONE ACETATE 10 MG/ML
SUSPENSION/ DROPS OPHTHALMIC AS NEEDED
Status: DISCONTINUED | OUTPATIENT
Start: 2024-01-16 | End: 2024-01-16 | Stop reason: HOSPADM

## 2024-01-16 RX ORDER — PROPOFOL 10 MG/ML
INJECTION, EMULSION INTRAVENOUS AS NEEDED
Status: DISCONTINUED | OUTPATIENT
Start: 2024-01-16 | End: 2024-01-16

## 2024-01-16 RX ORDER — MIDAZOLAM HYDROCHLORIDE 1 MG/ML
INJECTION, SOLUTION INTRAMUSCULAR; INTRAVENOUS AS NEEDED
Status: DISCONTINUED | OUTPATIENT
Start: 2024-01-16 | End: 2024-01-16

## 2024-01-16 RX ORDER — ONDANSETRON HYDROCHLORIDE 2 MG/ML
INJECTION, SOLUTION INTRAVENOUS AS NEEDED
Status: DISCONTINUED | OUTPATIENT
Start: 2024-01-16 | End: 2024-01-16

## 2024-01-16 RX ORDER — CYCLOPENTOLATE HYDROCHLORIDE 10 MG/ML
1 SOLUTION/ DROPS OPHTHALMIC
Status: COMPLETED | OUTPATIENT
Start: 2024-01-16 | End: 2024-01-16

## 2024-01-16 RX ORDER — TROPICAMIDE 10 MG/ML
1 SOLUTION/ DROPS OPHTHALMIC
Status: COMPLETED | OUTPATIENT
Start: 2024-01-16 | End: 2024-01-16

## 2024-01-16 RX ORDER — DICLOFENAC SODIUM 1 MG/ML
1 SOLUTION/ DROPS OPHTHALMIC
Status: COMPLETED | OUTPATIENT
Start: 2024-01-16 | End: 2024-01-16

## 2024-01-16 RX ORDER — ERYTHROMYCIN 5 MG/G
OINTMENT OPHTHALMIC AS NEEDED
Status: DISCONTINUED | OUTPATIENT
Start: 2024-01-16 | End: 2024-01-16 | Stop reason: HOSPADM

## 2024-01-16 RX ORDER — EPINEPHRINE 1 MG/ML
INJECTION, SOLUTION, CONCENTRATE INTRAVENOUS AS NEEDED
Status: DISCONTINUED | OUTPATIENT
Start: 2024-01-16 | End: 2024-01-16 | Stop reason: HOSPADM

## 2024-01-16 RX ORDER — MOXIFLOXACIN 5 MG/ML
SOLUTION/ DROPS OPHTHALMIC AS NEEDED
Status: DISCONTINUED | OUTPATIENT
Start: 2024-01-16 | End: 2024-01-16 | Stop reason: HOSPADM

## 2024-01-16 RX ORDER — DICLOFENAC SODIUM 1 MG/ML
SOLUTION/ DROPS OPHTHALMIC AS NEEDED
Status: DISCONTINUED | OUTPATIENT
Start: 2024-01-16 | End: 2024-01-16 | Stop reason: HOSPADM

## 2024-01-16 RX ADMIN — TROPICAMIDE 1 DROP: 10 SOLUTION/ DROPS OPHTHALMIC at 07:25

## 2024-01-16 RX ADMIN — CYCLOPENTOLATE HYDROCHLORIDE 1 DROP: 10 SOLUTION/ DROPS OPHTHALMIC at 07:15

## 2024-01-16 RX ADMIN — PHENYLEPHRINE HYDROCHLORIDE 1 DROP: 25 SOLUTION/ DROPS OPHTHALMIC at 07:20

## 2024-01-16 RX ADMIN — PROPOFOL 10 MG: 10 INJECTION, EMULSION INTRAVENOUS at 07:48

## 2024-01-16 RX ADMIN — DEXMEDETOMIDINE HYDROCHLORIDE 4 MCG: 100 INJECTION, SOLUTION INTRAVENOUS at 07:46

## 2024-01-16 RX ADMIN — ONDANSETRON 4 MG: 2 INJECTION INTRAMUSCULAR; INTRAVENOUS at 08:25

## 2024-01-16 RX ADMIN — PHENYLEPHRINE HYDROCHLORIDE 1 DROP: 25 SOLUTION/ DROPS OPHTHALMIC at 07:25

## 2024-01-16 RX ADMIN — PROPOFOL 10 MG: 10 INJECTION, EMULSION INTRAVENOUS at 07:50

## 2024-01-16 RX ADMIN — TROPICAMIDE 1 DROP: 10 SOLUTION/ DROPS OPHTHALMIC at 07:20

## 2024-01-16 RX ADMIN — TROPICAMIDE 1 DROP: 10 SOLUTION/ DROPS OPHTHALMIC at 07:15

## 2024-01-16 RX ADMIN — DICLOFENAC SODIUM 1 DROP: 1 SOLUTION OPHTHALMIC at 07:15

## 2024-01-16 RX ADMIN — DEXMEDETOMIDINE HYDROCHLORIDE 4 MCG: 100 INJECTION, SOLUTION INTRAVENOUS at 07:40

## 2024-01-16 RX ADMIN — PROPOFOL 10 MG: 10 INJECTION, EMULSION INTRAVENOUS at 07:49

## 2024-01-16 RX ADMIN — MOXIFLOXACIN 1 DROP: 5 SOLUTION/ DROPS OPHTHALMIC at 07:15

## 2024-01-16 RX ADMIN — DICLOFENAC SODIUM 1 DROP: 1 SOLUTION OPHTHALMIC at 07:20

## 2024-01-16 RX ADMIN — MOXIFLOXACIN 1 DROP: 5 SOLUTION/ DROPS OPHTHALMIC at 07:25

## 2024-01-16 RX ADMIN — PHENYLEPHRINE HYDROCHLORIDE 1 DROP: 25 SOLUTION/ DROPS OPHTHALMIC at 07:15

## 2024-01-16 RX ADMIN — CYCLOPENTOLATE HYDROCHLORIDE 1 DROP: 10 SOLUTION/ DROPS OPHTHALMIC at 07:20

## 2024-01-16 RX ADMIN — CYCLOPENTOLATE HYDROCHLORIDE 1 DROP: 10 SOLUTION/ DROPS OPHTHALMIC at 07:25

## 2024-01-16 RX ADMIN — MIDAZOLAM 1 MG: 1 INJECTION INTRAMUSCULAR; INTRAVENOUS at 07:50

## 2024-01-16 RX ADMIN — MOXIFLOXACIN 1 DROP: 5 SOLUTION/ DROPS OPHTHALMIC at 07:20

## 2024-01-16 RX ADMIN — MIDAZOLAM 1 MG: 1 INJECTION INTRAMUSCULAR; INTRAVENOUS at 07:47

## 2024-01-16 RX ADMIN — DICLOFENAC SODIUM 1 DROP: 1 SOLUTION OPHTHALMIC at 07:25

## 2024-01-16 ASSESSMENT — ENCOUNTER SYMPTOMS
ARTHRALGIAS: 0
EYE REDNESS: 0
ABDOMINAL PAIN: 0
CHEST TIGHTNESS: 0
FEVER: 0
WEAKNESS: 0
FATIGUE: 0
ABDOMINAL DISTENTION: 0
EYE PAIN: 0
NUMBNESS: 0
EYE DISCHARGE: 0
SINUS PAIN: 0
CHILLS: 0
FLANK PAIN: 0
MYALGIAS: 0
EYE ITCHING: 0
ACTIVITY CHANGE: 0
NERVOUS/ANXIOUS: 0
SINUS PRESSURE: 0

## 2024-01-16 ASSESSMENT — COLUMBIA-SUICIDE SEVERITY RATING SCALE - C-SSRS
1. IN THE PAST MONTH, HAVE YOU WISHED YOU WERE DEAD OR WISHED YOU COULD GO TO SLEEP AND NOT WAKE UP?: NO
2. HAVE YOU ACTUALLY HAD ANY THOUGHTS OF KILLING YOURSELF?: NO
6. HAVE YOU EVER DONE ANYTHING, STARTED TO DO ANYTHING, OR PREPARED TO DO ANYTHING TO END YOUR LIFE?: NO

## 2024-01-16 ASSESSMENT — PAIN - FUNCTIONAL ASSESSMENT: PAIN_FUNCTIONAL_ASSESSMENT: 0-10

## 2024-01-16 ASSESSMENT — PAIN SCALES - GENERAL
PAINLEVEL_OUTOF10: 0 - NO PAIN

## 2024-01-16 NOTE — ANESTHESIA PREPROCEDURE EVALUATION
Patient: Mishel Wagner    Procedure Information       Date/Time: 01/16/24 0730    Procedures:       Extraction Cataract with Insertion Intraocular Lens (Left)      Goniotomy (Left)    [facility] [place] [facility]    Surgeons: Radha Coombs MD            Relevant Problems   Anesthesia (within normal limits)      Cardiovascular   (+) Hyperlipidemia      Endocrine   (+) Central obesity   (+) Goiter      GI   (+) GERD (gastroesophageal reflux disease)      /Renal   (+) Hepatitis C, chronic (CMS/HCC)   (+) Inflammatory liver disease      GI/Hepatic   (+) Hepatitis C, chronic (CMS/HCC)   (+) Inflammatory liver disease      Eyes, Ears, Nose, and Throat   (+) Primary open angle glaucoma (POAG) of both eyes, mild stage   (+) Primary open angle glaucoma of left eye, moderate stage   (+) Primary open angle glaucoma of right eye, moderate stage   (+) Primary open-angle glaucoma, bilateral, mild stage   (+) Sensorineural hearing loss (SNHL) of both ears      Infectious Disease   (+) Acquired immunodeficiency syndrome (CMS/HCC)   (+) Genital HSV   (+) HIV disease (CMS/HCC)   (+) Hepatitis C, chronic (CMS/HCC)       Clinical information reviewed:   Tobacco  Allergies  Meds   Med Hx  Surg Hx   Fam Hx  Soc Hx        NPO Detail:  NPO/Void Status  Date of Last Liquid: 01/15/24  Time of Last Liquid: 2300  Date of Last Solid: 01/15/24  Time of Last Solid: 2300  Last Intake Type: Light meal  Time of Last Void: 0600         Vitals:    01/16/24 0703   BP: 122/60   Pulse: 94   Resp: 16   Temp: 37.5 °C (99.5 °F)   SpO2: 99%       Physical Exam    Airway  Mallampati: III     Cardiovascular   Rhythm: regular  Rate: normal     Dental   Comments: Poor dentition.  Multiple missing teeth, none loose.   Pulmonary   Breath sounds clear to auscultation     Abdominal   (+) obese             Anesthesia Plan    History of general anesthesia?: yes  History of complications of general anesthesia?: no    ASA 3     MAC      Anesthetic plan and risks discussed with patient.

## 2024-01-16 NOTE — DISCHARGE SUMMARY
Discharge Diagnosis  Visually significant cataract, left eye  Primary open angle glaucoma, left eye     Issues Requiring Follow-Up  Follow up appt tomorrow AM, keep patch and shield on until them. Start drops as directed after f/u tomorrow.    Test Results Pending At Discharge  Pending Labs       No current pending labs.            Hospital Course   Patient admitted for cataract extraction with intraocular lens placement/goniotomy left eye. Patient tolerated procedure well, no complications.    Pertinent Physical Exam At Time of Discharge  Physical Exam    Home Medications     Medication List      CONTINUE taking these medications     abacavir-lamiVUDine 600-300 mg tablet; Commonly known as: Epzicom   acyclovir 200 mg capsule; Commonly known as: Zovirax   aspirin 81 mg EC tablet   atorvastatin 40 mg tablet; Commonly known as: Lipitor   Cabenuva 600 mg/3 mL- 900 mg/3 mL suspension,extended release; Generic   drug: cabotegravir-rilpivirine; INJECT 3 ML OF CABOTEGRAVIR AND 3 ML OF   RILPIVIRINE INTO THE MUSCLE EVERY 2 MONTHS.   Climara 0.1 mg/24 hr patch; Generic drug: estradiol; Place 1 patch over   7 days on the skin 1 (one) time per week.   * Colace 100 mg capsule; Generic drug: docusate sodium   * docusate sodium 100 mg capsule; Commonly known as: Colace   finasteride 5 mg tablet; Commonly known as: Proscar; Take 1 tablet (5   mg) by mouth once daily.   latanoprost 0.005 % ophthalmic solution; Commonly known as: Xalatan;   Administer 1 drop into both eyes once daily at bedtime.   MAGNESIUM OXIDE ORAL   moxifloxacin 0.5 % ophthalmic solution; Commonly known as: Vigamox;   Administer 1 drop into the left eye 4 times a day.   prednisoLONE acetate 1 % ophthalmic suspension; Commonly known as:   Pred-Forte; Administer 1 drop into the left eye 4 times a day.   RANITIDINE HCL ORAL  * This list has 2 medication(s) that are the same as other medications   prescribed for you. Read the directions carefully, and ask your  doctor or   other care provider to review them with you.       Outpatient Follow-Up  Future Appointments   Date Time Provider Department [place]   1/17/2024  9:30 AM Radha Coombs MD [id number] [facility]   1/25/2024 10:15 AM Radha Coombs MD [id number] [facility]   2/22/2024  9:00 AM Shalonda Sheehan RN [id number] [facility]   3/15/2024  9:00 AM Quita Caballero MD [id number] [facility]       Vishnu Palma MD

## 2024-01-16 NOTE — PROGRESS NOTES
Patient called about her upcoming appointments for cabenuva injections and to see Dr. Caballero.  After talking to patient about her appointments.  RN explained the need for an exercise stress test and to see a cardiologist as her EKG was abnormal during her research visit.  Verbalized understanding of discussion.  Transferred patient to [facility] so she can make the needed appointments.

## 2024-01-16 NOTE — H&P
History Of Present Illness  Mishel Wagner is a 73 y.o. female presenting with visually significant cataract, primary open angle glaucoma.     Past Medical History  Past Medical History:   Diagnosis Date    Combined forms of age-related cataract, left eye 10/25/2018    Combined form of age-related cataract, left eye    Combined forms of age-related cataract, right eye 10/25/2018    Combined form of age-related cataract, right eye    Cortical age-related cataract, unspecified eye 06/28/2016    Cataract cortical, senile    Disorder of ligament, unspecified wrist     Disorder of ligament of wrist    Encounter for screening for malignant neoplasm of colon 08/13/2018    Colon cancer screening    Furuncle of back (any part, except buttock) 11/01/2018    Boil, back    Furuncle, unspecified     Boil    Furuncle, unspecified 08/12/2022    Boil    Human immunodeficiency virus (HIV) disease (CMS/[facility]) 01/16/2018    HIV infection    Human immunodeficiency virus (HIV) disease (CMS/[facility]) 01/16/2018    HIV disease    Hyperlipidemia     Hypermetropia, bilateral 10/24/2022    Hyperopia of both eyes with astigmatism and presbyopia    Hypermetropia, unspecified eye 09/18/2018    Hyperopia with astigmatism and presbyopia    Melena 02/16/2015    Blood in stool    Nicotine dependence, cigarettes, uncomplicated 02/04/2019    Cigarette nicotine dependence without complication    Other abnormal and inconclusive findings on diagnostic imaging of breast 10/21/2015    Abnormal finding on mammography    Other conditions influencing health status     Epicondylitis    Other conditions influencing health status 06/25/2021    History of cough    Pain in left shoulder 10/25/2016    Left shoulder pain    Pain in left shoulder 11/21/2016    Left shoulder pain, unspecified chronicity    Pain in right shoulder 11/21/2016    Right shoulder pain    Person consulting for explanation of examination or test findings     Visit for review of DEXA scan    Personal  history of infections of the central nervous system     History of meningitis    Personal history of nicotine dependence 08/13/2018    History of nicotine dependence    Personal history of other diseases of the digestive system 04/28/2014    History of constipation    Personal history of other diseases of the musculoskeletal system and connective tissue 05/30/2017    History of low back pain    Personal history of other infectious and parasitic diseases     History of syphilis    Personal history of other infectious and parasitic diseases 04/28/2014    History of herpes simplex infection    Personal history of other specified conditions     History of vomiting    Personal history of other specified conditions     History of abnormal weight loss    Preglaucoma, unspecified, bilateral 01/16/2018    Glaucoma suspect of both eyes    Preglaucoma, unspecified, unspecified eye 05/30/2017    Glaucoma suspect    Preglaucoma, unspecified, unspecified eye     Glaucoma suspect    Primary open-angle glaucoma, bilateral, moderate stage 01/16/2018    Primary open angle glaucoma of both eyes, moderate stage    Rash and other nonspecific skin eruption     Skin rash    Rash and other nonspecific skin eruption 08/24/2015    Rash    Rash and other nonspecific skin eruption 04/28/2014    Rash       Surgical History  Past Surgical History:   Procedure Laterality Date    COLONOSCOPY  04/28/2014    Colonoscopy (Fiberoptic)    OTHER SURGICAL HISTORY  01/27/2014    Biopsy Of Liver    OTHER SURGICAL HISTORY  01/27/2014    Laryngoscopy (Diagnostic)        Social History  She reports that she has never smoked. She has never used smokeless tobacco. She reports that she does not drink alcohol and does not use drugs.    Family History  Family History   Problem Relation Name Age of Onset    Alzheimer's disease Mother      Cataracts Mother      Diabetes Mother      Breast cancer Mother          Unknow age of onset    Diabetes Brother           Allergies  Amoxicillin, Efavirenz, Fenofibrate, and Ritonavir    Review of Systems   Constitutional:  Negative for activity change, chills, fatigue and fever.   HENT:  Negative for congestion, sinus pressure and sinus pain.    Eyes:  Negative for pain, discharge, redness and itching.   Respiratory:  Negative for chest tightness.    Cardiovascular:  Negative for chest pain.   Gastrointestinal:  Negative for abdominal distention and abdominal pain.   Endocrine: Negative for polyuria.   Genitourinary:  Negative for flank pain.   Musculoskeletal:  Negative for arthralgias and myalgias.   Skin:  Negative for rash.   Neurological:  Negative for weakness and numbness.   Psychiatric/Behavioral:  The patient is not nervous/anxious.         Physical Exam  Constitutional:       Appearance: Normal appearance.   HENT:      Head: Normocephalic and atraumatic.      Nose: Nose normal.      Mouth/Throat:      Mouth: Mucous membranes are moist.   Eyes:      Extraocular Movements: Extraocular movements intact.      Conjunctiva/sclera: Conjunctivae normal.      Pupils: Pupils are equal, round, and reactive to light.   Cardiovascular:      Pulses: Normal pulses.   Pulmonary:      Effort: Pulmonary effort is normal. No respiratory distress.   Abdominal:      General: Abdomen is flat.      Palpations: Abdomen is soft.   Musculoskeletal:         General: No swelling or tenderness.      Cervical back: Normal range of motion.   Skin:     General: Skin is warm and dry.      Capillary Refill: Capillary refill takes less than 2 seconds.   Neurological:      General: No focal deficit present.      Mental Status: She is alert and oriented to person, place, and time. Mental status is at baseline.   Psychiatric:         Mood and Affect: Mood normal.         Behavior: Behavior normal.         Thought Content: Thought content normal.         Judgment: Judgment normal.          Last Recorded Vitals  Blood pressure 122/60, pulse 94, temperature 37.5  "°C (99.5 °F), temperature source Temporal, resp. rate 16, height 1.651 m (5' 5\"), weight 65.8 kg (145 lb), SpO2 99 %.    Relevant Results         Assessment/Plan   Active Problems:    Cataract of both eyes    Primary open angle glaucoma (POAG) of both eyes, mild stage      This patient presents for cataract and open angle glaucoma in the left eye for cataract extraction with intraocular lens placement as well as goniotomy in the left eye. Will proceed, post op note to follow.           Vishnu Palma MD    "

## 2024-01-16 NOTE — OP NOTE
Cataract Phacoemulsification w/I0L (L) Operative Note     Date: 2024  OR Location: [facility] OR    Name: Mishel Wagner, : 1950, Age: 73 y.o., MRN: [id number], Sex: female    Diagnosis  Pre-op Diagnosis     * Cataract of both eyes, unspecified cataract type [H26.9]     * Primary open angle glaucoma (POAG) of both eyes, mild stage [H40.1131] Post-op Diagnosis     * Cataract of both eyes, unspecified cataract type [H26.9]     * Primary open angle glaucoma (POAG) of both eyes, mild stage [H40.1131]     Procedures  Extraction Cataract with Insertion Intraocular Lens  27337 - AL XCAPSL CTRC RMVL INSJ IO LENS PROSTH W/O ECP    Goniotomy  37782 - AL GONIOTOMY  , LEFT    Surgeons   Panel 1:     * Radha Coombs - Primary  Panel 2:     * Radha Coombs - Primary    Resident/Fellow/Other Assistant:  Louie    Procedure Summary  Anesthesia: Monitor Anesthesia Care  ASA: III  Anesthesia Staff:   Anesthesiologist: Ilda Vázquez MD MPH  CRNA: NARCISA Clayton-CRNA  Estimated Blood Loss: none        Specimen: No specimens collected     Staff:   Circulator: Miguelina Brush RN  Scrub Person: Goldie Estrella RN          Findings: as expected    Indications: Mishel Wagner is an 73 y.o. female who is having surgery for Cataract of both eyes, unspecified cataract type [H26.9]  Primary open angle glaucoma (POAG) of both eyes, mild stage [H40.1131]. LEFT    The patient was seen in the preoperative area. The risks, benefits, complications, treatment options, non-operative alternatives, expected recovery and outcomes were discussed with the patient. The possibilities of reaction to medication, pulmonary aspiration, injury to surrounding structures, bleeding, recurrent infection, the need for additional procedures, failure to diagnose a condition, and creating a complication or operation were discussed with the patient. The patient concurred with the proposed plan, giving informed consent.  The site of surgery was  properly noted/marked if necessary per policy.     Procedure Details:   The patient was escorted to the operating room where a time out was completed   and monitored anesthesia care was begun. A retrobulbar block was administered without complication. The patient was prepped and draped in   the usual sterile fashion for intraocular surgery. A lid speculum was placed   and the operating microscope was positioned. A paracentesis was made to the   left of the planned cataract incision with a 1mm blade.  Shugarcaine followed by Viscoat was used to   replace the aqueous humor. A temporal clear corneal wound was made with a 2.4   mm keratome, extending 2 mm into clear cornea before entering the anterior   chamber. A continuous curvilinear  capsulorhexis of approximately 5 mm in   diameter was performed. Hydrodissection was performed using a canula. The   pre-chopper was used to crack the nucleus into smaller pieces which were then   removed with the assistance of a horizontal chopper and phaco hand piece.   Residual cortex was removed with IA. ProVisc was used to inflate the capsular   bag. The lens implant  nabeel SN60WF 20.0 diopter intraocular lens. The lens was   inserted into the capsular bag and rotated to the proper position with a   nicolette. The patient was then positioned for direct gonioscopy. A hour 3 clock hour nasal goniotomy was made.   The microcatheter was primed and then thread for 360 degrees and then retracted whilst   injecting viscoelastic approximately 8-10 clock hours per quadrant. the device   was then removed an dthe patietn was repositioned supine. Residual   viscoelastic was removed from the eye with the irrigation/aspiration   instrument. The wounds were checked and found to be watertight. The anterior   chamber was at physiologic depth and pressure. The lid speculum was removed and   a patch and shield were taped in place. The patient tolerated the procedure   well, and there were no  complications.   Complications:  None   Disposition: stable          Attending Attestation: I was present and scrubbed for the entire procedure    Radha Coombs  Phone Number: [phone]

## 2024-01-16 NOTE — ANESTHESIA POSTPROCEDURE EVALUATION
Patient: Mishel Wagner    Procedure Summary       Date: 01/16/24 Room / Location: [facility] 02 / [facility]    Anesthesia Start: 0740 Anesthesia Stop: 0830    Procedures:       Extraction Cataract with Insertion Intraocular Lens (Left: Eye)      Goniotomy (Left: Eye) Diagnosis:       Cataract of both eyes, unspecified cataract type      Primary open angle glaucoma (POAG) of both eyes, mild stage      (Cataract of both eyes, unspecified cataract type [H26.9])      (Primary open angle glaucoma (POAG) of both eyes, mild stage [H40.1131])    Surgeons: Radha Coombs MD Responsible Provider: Ilda Vázquez MD MPH    Anesthesia Type: MAC ASA Status: 3            Anesthesia Type: MAC    Vitals Value Taken Time   /58 01/16/24 0841   Temp 36.1 °C (97 °F) 01/16/24 0841   Pulse 80 01/16/24 0841   Resp 16 01/16/24 0841   SpO2 100 % 01/16/24 0841       Anesthesia Post Evaluation    Patient location during evaluation: PACU  Patient participation: complete - patient participated  Level of consciousness: awake  Pain management: adequate  Airway patency: patent  Cardiovascular status: acceptable  Respiratory status: acceptable  Hydration status: acceptable  Postoperative Nausea and Vomiting: none    There were no known notable events for this encounter.

## 2024-01-17 ENCOUNTER — OFFICE VISIT (OUTPATIENT)
Dept: OPHTHALMOLOGY | Facility: CLINIC | Age: 74
End: 2024-01-17
Payer: COMMERCIAL

## 2024-01-17 DIAGNOSIS — Z00.6 RESEARCH STUDY PATIENT: ICD-10-CM

## 2024-01-17 DIAGNOSIS — Z96.1 PSEUDOPHAKIA: Primary | ICD-10-CM

## 2024-01-17 DIAGNOSIS — H40.1131 PRIMARY OPEN ANGLE GLAUCOMA (POAG) OF BOTH EYES, MILD STAGE: ICD-10-CM

## 2024-01-17 PROCEDURE — 99024 POSTOP FOLLOW-UP VISIT: CPT | Performed by: OPHTHALMOLOGY

## 2024-01-17 ASSESSMENT — CONF VISUAL FIELD
OS_SUPERIOR_TEMPORAL_RESTRICTION: 0
OD_SUPERIOR_NASAL_RESTRICTION: 0
OD_INFERIOR_TEMPORAL_RESTRICTION: 0
OS_INFERIOR_TEMPORAL_RESTRICTION: 0
OS_SUPERIOR_NASAL_RESTRICTION: 0
OD_SUPERIOR_TEMPORAL_RESTRICTION: 0
OD_NORMAL: 1
OS_INFERIOR_NASAL_RESTRICTION: 0
OS_NORMAL: 1
OD_INFERIOR_NASAL_RESTRICTION: 0

## 2024-01-17 ASSESSMENT — VISUAL ACUITY
OS_SC+: -1
METHOD: SNELLEN - LINEAR
OD_SC: 20/40
OS_SC: 20/50

## 2024-01-17 ASSESSMENT — ENCOUNTER SYMPTOMS
GASTROINTESTINAL NEGATIVE: 0
PSYCHIATRIC NEGATIVE: 0
HEMATOLOGIC/LYMPHATIC NEGATIVE: 0
NEUROLOGICAL NEGATIVE: 0
ALLERGIC/IMMUNOLOGIC NEGATIVE: 0
RESPIRATORY NEGATIVE: 0
ENDOCRINE NEGATIVE: 0
MUSCULOSKELETAL NEGATIVE: 0
EYES NEGATIVE: 0
CONSTITUTIONAL NEGATIVE: 0
CARDIOVASCULAR NEGATIVE: 0

## 2024-01-17 ASSESSMENT — TONOMETRY
OS_IOP_MMHG: 12
IOP_METHOD: GOLDMANN APPLANATION
OD_IOP_MMHG: 14

## 2024-01-17 ASSESSMENT — PACHYMETRY
OD_CT(UM): 570
OS_CT(UM): 570

## 2024-01-17 ASSESSMENT — EXTERNAL EXAM - LEFT EYE: OS_EXAM: NORMAL

## 2024-01-17 ASSESSMENT — EXTERNAL EXAM - RIGHT EYE: OD_EXAM: NORMAL

## 2024-01-17 ASSESSMENT — SLIT LAMP EXAM - LIDS
COMMENTS: NORMAL
COMMENTS: NORMAL

## 2024-01-17 NOTE — PROGRESS NOTES
POD1 s/p ce/iol/abic, left   doing well  Cont pred/moxi QID  Hyphema precautions  Discontinue glaucoma drops  shield and activity restrictions    POw2 s/p ce/iol/abic, right   doing well  taper pred  Stop moxi QID  Discontinue glaucoma drops  discontinue shield and activity restrictions.  Proceed with OS

## 2024-01-19 ENCOUNTER — APPOINTMENT (OUTPATIENT)
Dept: OPHTHALMOLOGY | Facility: CLINIC | Age: 74
End: 2024-01-19
Payer: COMMERCIAL

## 2024-01-25 ENCOUNTER — OFFICE VISIT (OUTPATIENT)
Dept: OPHTHALMOLOGY | Facility: CLINIC | Age: 74
End: 2024-01-25
Payer: COMMERCIAL

## 2024-01-25 DIAGNOSIS — H40.1131 PRIMARY OPEN ANGLE GLAUCOMA (POAG) OF BOTH EYES, MILD STAGE: ICD-10-CM

## 2024-01-25 DIAGNOSIS — Z96.1 PSEUDOPHAKIA: Primary | ICD-10-CM

## 2024-01-25 PROCEDURE — 99024 POSTOP FOLLOW-UP VISIT: CPT | Performed by: OPHTHALMOLOGY

## 2024-01-25 ASSESSMENT — VISUAL ACUITY
OS_SC: 20/20
METHOD: SNELLEN - LINEAR
OD_SC: 20/30
OD_PH_SC: 20/25
OS_SC+: -2
OD_PH_SC+: -3

## 2024-01-25 ASSESSMENT — ENCOUNTER SYMPTOMS
RESPIRATORY NEGATIVE: 0
ENDOCRINE NEGATIVE: 0
ALLERGIC/IMMUNOLOGIC NEGATIVE: 0
PSYCHIATRIC NEGATIVE: 0
NEUROLOGICAL NEGATIVE: 0
CARDIOVASCULAR NEGATIVE: 0
MUSCULOSKELETAL NEGATIVE: 0
CONSTITUTIONAL NEGATIVE: 0
HEMATOLOGIC/LYMPHATIC NEGATIVE: 0
EYES NEGATIVE: 0
GASTROINTESTINAL NEGATIVE: 0

## 2024-01-25 ASSESSMENT — CONF VISUAL FIELD
OS_NORMAL: 1
OD_SUPERIOR_NASAL_RESTRICTION: 0
OD_INFERIOR_NASAL_RESTRICTION: 0
OS_INFERIOR_TEMPORAL_RESTRICTION: 0
OD_INFERIOR_TEMPORAL_RESTRICTION: 0
OS_INFERIOR_NASAL_RESTRICTION: 0
OS_SUPERIOR_NASAL_RESTRICTION: 0
OS_SUPERIOR_TEMPORAL_RESTRICTION: 0
OD_NORMAL: 1
OD_SUPERIOR_TEMPORAL_RESTRICTION: 0
METHOD: COUNTING FINGERS

## 2024-01-25 ASSESSMENT — PACHYMETRY
OD_CT(UM): 570
OS_CT(UM): 570

## 2024-01-25 ASSESSMENT — TONOMETRY
OD_IOP_MMHG: 14
OS_IOP_MMHG: 15
IOP_METHOD: GOLDMANN APPLANATION

## 2024-01-25 ASSESSMENT — SLIT LAMP EXAM - LIDS
COMMENTS: NORMAL
COMMENTS: NORMAL

## 2024-01-25 ASSESSMENT — EXTERNAL EXAM - LEFT EYE: OS_EXAM: NORMAL

## 2024-01-25 ASSESSMENT — EXTERNAL EXAM - RIGHT EYE: OD_EXAM: NORMAL

## 2024-01-25 NOTE — PROGRESS NOTES
POW1 s/p ce/iol/abic, left   doing well  TAPER pred/  STOP moxi QID  Hyphema precautions   Discontinue glaucoma drops  discontinue shield and activity restrictions  RTC 3-4 WEEKS mrx/iop check    POw3 s/p ce/iol/abic, right   doing well  taper pred  Stop moxi QID  Discontinue glaucoma drops  discontinue shield and activity restrictions.  Proceed with OS

## 2024-02-15 DIAGNOSIS — B20 HIV DISEASE (MULTI): Primary | ICD-10-CM

## 2024-02-16 ENCOUNTER — APPOINTMENT (OUTPATIENT)
Dept: CARDIOLOGY | Facility: HOSPITAL | Age: 74
End: 2024-02-16
Payer: COMMERCIAL

## 2024-02-22 ENCOUNTER — APPOINTMENT (OUTPATIENT)
Dept: OPHTHALMOLOGY | Facility: CLINIC | Age: 74
End: 2024-02-22
Payer: COMMERCIAL

## 2024-02-22 ENCOUNTER — CLINICAL SUPPORT (OUTPATIENT)
Dept: IMMUNOLOGY | Facility: CLINIC | Age: 74
End: 2024-02-22
Payer: COMMERCIAL

## 2024-02-22 DIAGNOSIS — B20 HIV DISEASE (MULTI): ICD-10-CM

## 2024-02-22 DIAGNOSIS — Z79.899 HIGH RISK MEDICATION USE: ICD-10-CM

## 2024-02-22 DIAGNOSIS — E78.5 HYPERLIPIDEMIA, UNSPECIFIED HYPERLIPIDEMIA TYPE: ICD-10-CM

## 2024-02-22 DIAGNOSIS — B20 HUMAN IMMUNODEFICIENCY VIRUS (HIV) DISEASE (MULTI): ICD-10-CM

## 2024-02-22 DIAGNOSIS — Z11.3 ROUTINE SCREENING FOR STI (SEXUALLY TRANSMITTED INFECTION): ICD-10-CM

## 2024-02-22 LAB
ALBUMIN SERPL BCP-MCNC: 3.8 G/DL (ref 3.4–5)
ALP SERPL-CCNC: 49 U/L (ref 33–136)
ALT SERPL W P-5'-P-CCNC: 9 U/L (ref 7–45)
ANION GAP SERPL CALC-SCNC: 14 MMOL/L (ref 10–20)
AST SERPL W P-5'-P-CCNC: 15 U/L (ref 9–39)
BASOPHILS # BLD AUTO: 0.04 X10*3/UL (ref 0–0.1)
BASOPHILS NFR BLD AUTO: 0.6 %
BILIRUB SERPL-MCNC: 0.3 MG/DL (ref 0–1.2)
BUN SERPL-MCNC: 14 MG/DL (ref 6–23)
CALCIUM SERPL-MCNC: 9.6 MG/DL (ref 8.6–10.6)
CD3+CD4+ CELLS # BLD: 1.33 X10E9/L
CD3+CD4+ CELLS # BLD: 1333 /MM3
CD3+CD4+ CELLS NFR BLD: 49 %
CD3+CD4+ CELLS/CD3+CD8+ CLL BLD: 1.53 %
CD3+CD8+ CELLS # BLD: 0.87 X10E9/L
CD3+CD8+ CELLS NFR BLD: 32 %
CHLORIDE SERPL-SCNC: 101 MMOL/L (ref 98–107)
CHOLEST SERPL-MCNC: 197 MG/DL (ref 0–199)
CHOLESTEROL/HDL RATIO: 2.6
CO2 SERPL-SCNC: 25 MMOL/L (ref 21–32)
CREAT SERPL-MCNC: 1.02 MG/DL (ref 0.5–1.05)
EGFRCR SERPLBLD CKD-EPI 2021: 59 ML/MIN/1.73M*2
EOSINOPHIL # BLD AUTO: 0.04 X10*3/UL (ref 0–0.4)
EOSINOPHIL NFR BLD AUTO: 0.6 %
ERYTHROCYTE [DISTWIDTH] IN BLOOD BY AUTOMATED COUNT: 16.3 % (ref 11.5–14.5)
GLUCOSE SERPL-MCNC: 68 MG/DL (ref 74–99)
HCT VFR BLD AUTO: 32.8 % (ref 36–46)
HDLC SERPL-MCNC: 76 MG/DL
HGB BLD-MCNC: 10.1 G/DL (ref 12–16)
IMM GRANULOCYTES # BLD AUTO: 0.01 X10*3/UL (ref 0–0.5)
IMM GRANULOCYTES NFR BLD AUTO: 0.2 % (ref 0–0.9)
LDLC SERPL CALC-MCNC: 110 MG/DL
LYMPHOCYTES # BLD AUTO: 2.72 X10*3/UL (ref 0.8–3)
LYMPHOCYTES # SPEC AUTO: 2.72 X10*3/UL
LYMPHOCYTES NFR BLD AUTO: 40.8 %
MCH RBC QN AUTO: 25.8 PG (ref 26–34)
MCHC RBC AUTO-ENTMCNC: 30.8 G/DL (ref 32–36)
MCV RBC AUTO: 84 FL (ref 80–100)
MONOCYTES # BLD AUTO: 0.47 X10*3/UL (ref 0.05–0.8)
MONOCYTES NFR BLD AUTO: 7.1 %
NEUTROPHILS # BLD AUTO: 3.38 X10*3/UL (ref 1.6–5.5)
NEUTROPHILS NFR BLD AUTO: 50.7 %
NON HDL CHOLESTEROL: 121 MG/DL (ref 0–149)
NRBC BLD-RTO: 0 /100 WBCS (ref 0–0)
PLATELET # BLD AUTO: 284 X10*3/UL (ref 150–450)
POTASSIUM SERPL-SCNC: 4 MMOL/L (ref 3.5–5.3)
PROT SERPL-MCNC: 6.7 G/DL (ref 6.4–8.2)
RBC # BLD AUTO: 3.92 X10*6/UL (ref 4–5.2)
SODIUM SERPL-SCNC: 136 MMOL/L (ref 136–145)
TREPONEMA PALLIDUM IGG+IGM AB [PRESENCE] IN SERUM OR PLASMA BY IMMUNOASSAY: REACTIVE
TRIGL SERPL-MCNC: 56 MG/DL (ref 0–149)
VLDL: 11 MG/DL (ref 0–40)
WBC # BLD AUTO: 6.7 X10*3/UL (ref 4.4–11.3)

## 2024-02-22 PROCEDURE — 86318 IA INFECTIOUS AGENT ANTIBODY: CPT

## 2024-02-22 PROCEDURE — 85025 COMPLETE CBC W/AUTO DIFF WBC: CPT

## 2024-02-22 PROCEDURE — 2500000004 HC RX 250 GENERAL PHARMACY W/ HCPCS (ALT 636 FOR OP/ED): Mod: JZ | Performed by: INTERNAL MEDICINE

## 2024-02-22 PROCEDURE — 36415 COLL VENOUS BLD VENIPUNCTURE: CPT

## 2024-02-22 PROCEDURE — 96372 THER/PROPH/DIAG INJ SC/IM: CPT | Performed by: INTERNAL MEDICINE

## 2024-02-22 PROCEDURE — 86780 TREPONEMA PALLIDUM: CPT

## 2024-02-22 PROCEDURE — 87491 CHLMYD TRACH DNA AMP PROBE: CPT

## 2024-02-22 PROCEDURE — 87591 N.GONORRHOEAE DNA AMP PROB: CPT

## 2024-02-22 PROCEDURE — 87536 HIV-1 QUANT&REVRSE TRNSCRPJ: CPT

## 2024-02-22 PROCEDURE — 80053 COMPREHEN METABOLIC PANEL: CPT

## 2024-02-22 PROCEDURE — 80061 LIPID PANEL: CPT

## 2024-02-22 PROCEDURE — 88185 FLOWCYTOMETRY/TC ADD-ON: CPT | Mod: TC

## 2024-02-22 RX ADMIN — CABOTEGRAVIR AND RILPIVIRINE 6 ML: KIT at 10:00

## 2024-02-22 NOTE — PROGRESS NOTES
Patient was here for Cabenuva injections.  Meds delivered by patient's pharmacy.  Rilpivirine administered to right gluteal muscle without incident.  Cabotegravir administered to left gluteal muscle without incident.  Patient tolerated injections.

## 2024-02-23 LAB
C TRACH RRNA SPEC QL NAA+PROBE: NEGATIVE
HIV1 RNA # PLAS NAA DL=20: ABNORMAL {COPIES}/ML
HIV1 RNA SPEC NAA+PROBE-LOG#: ABNORMAL {LOG_COPIES}/ML
N GONORRHOEA DNA SPEC QL PROBE+SIG AMP: NEGATIVE
RPR SER QL: NONREACTIVE
T PALLIDUM AB SER QL AGGL: REACTIVE

## 2024-03-05 DIAGNOSIS — R94.31 ABNORMAL ECG: ICD-10-CM

## 2024-03-05 NOTE — PROGRESS NOTES
Patient had an abnormal ECG on study.  Dr. Caballero wanted patient to get a stress echo and be referred to cardiology.  Patient's insurance will not allow payment for cardiology.  Dr. Caballero aware.  Patient should still be seen by a cardiologist.  Patient re referred.

## 2024-03-11 ENCOUNTER — APPOINTMENT (OUTPATIENT)
Dept: IMMUNOLOGY | Facility: CLINIC | Age: 74
End: 2024-03-11
Payer: COMMERCIAL

## 2024-03-13 ENCOUNTER — OFFICE VISIT (OUTPATIENT)
Dept: OPHTHALMOLOGY | Facility: CLINIC | Age: 74
End: 2024-03-13
Payer: COMMERCIAL

## 2024-03-13 DIAGNOSIS — H40.1131 PRIMARY OPEN ANGLE GLAUCOMA (POAG) OF BOTH EYES, MILD STAGE: Primary | ICD-10-CM

## 2024-03-13 PROCEDURE — 99024 POSTOP FOLLOW-UP VISIT: CPT | Performed by: OPHTHALMOLOGY

## 2024-03-13 ASSESSMENT — REFRACTION_MANIFEST
OD_CYLINDER: +1.75
METHOD_AUTOREFRACTION: 1
OS_CYLINDER: +1.25
OD_SPHERE: -1.50
OD_CYLINDER: +1.75
OS_AXIS: 166
OD_AXIS: 010
OS_AXIS: 165
OD_ADD: +2.50
OS_ADD: +2.50
OS_CYLINDER: -1.75
OD_AXIS: 008
OS_SPHERE: -1.00
OD_SPHERE: -1.75
OS_SPHERE: -1.00

## 2024-03-13 ASSESSMENT — CONF VISUAL FIELD
OD_NORMAL: 1
OS_SUPERIOR_TEMPORAL_RESTRICTION: 0
OD_SUPERIOR_NASAL_RESTRICTION: 0
OD_INFERIOR_NASAL_RESTRICTION: 0
OS_NORMAL: 1
OS_INFERIOR_TEMPORAL_RESTRICTION: 0
OS_INFERIOR_NASAL_RESTRICTION: 0
OD_INFERIOR_TEMPORAL_RESTRICTION: 0
OD_SUPERIOR_TEMPORAL_RESTRICTION: 0
OS_SUPERIOR_NASAL_RESTRICTION: 0

## 2024-03-13 ASSESSMENT — ENCOUNTER SYMPTOMS
HEMATOLOGIC/LYMPHATIC NEGATIVE: 0
ALLERGIC/IMMUNOLOGIC NEGATIVE: 0
NEUROLOGICAL NEGATIVE: 0
ENDOCRINE NEGATIVE: 0
CONSTITUTIONAL NEGATIVE: 0
GASTROINTESTINAL NEGATIVE: 0
RESPIRATORY NEGATIVE: 0
PSYCHIATRIC NEGATIVE: 0
CARDIOVASCULAR NEGATIVE: 0
MUSCULOSKELETAL NEGATIVE: 0
EYES NEGATIVE: 0

## 2024-03-13 ASSESSMENT — EXTERNAL EXAM - RIGHT EYE: OD_EXAM: NORMAL

## 2024-03-13 ASSESSMENT — VISUAL ACUITY
OD_SC+: +2
METHOD: SNELLEN - LINEAR
OD_PH_SC: 20/20
OS_SC: 20/20
OD_SC: 20/50
OS_SC+: -1

## 2024-03-13 ASSESSMENT — TONOMETRY
IOP_METHOD: GOLDMANN APPLANATION
OD_IOP_MMHG: 16
OS_IOP_MMHG: 15

## 2024-03-13 ASSESSMENT — SLIT LAMP EXAM - LIDS
COMMENTS: NORMAL
COMMENTS: NORMAL

## 2024-03-13 ASSESSMENT — PACHYMETRY
OS_CT(UM): 570
OD_CT(UM): 570

## 2024-03-13 ASSESSMENT — EXTERNAL EXAM - LEFT EYE: OS_EXAM: NORMAL

## 2024-03-13 NOTE — PROGRESS NOTES
POM2 s/p ce/iol/abic, left   doing well, please with outcome  Finished with postop gtts  Continue monitoring IOP, hold off on restarting latanoprost    POM2.5 s/p ce/iol/abic, right   doing well  Finished with postop gtts  Continue monitoring IOP, hold off on restarting latanoprost    Mrx dispensed    RTC 3-4 months for IOP check,

## 2024-03-15 ENCOUNTER — APPOINTMENT (OUTPATIENT)
Dept: IMMUNOLOGY | Facility: CLINIC | Age: 74
End: 2024-03-15
Payer: COMMERCIAL

## 2024-03-15 ENCOUNTER — OFFICE VISIT (OUTPATIENT)
Dept: IMMUNOLOGY | Facility: CLINIC | Age: 74
End: 2024-03-15
Payer: COMMERCIAL

## 2024-03-15 VITALS
DIASTOLIC BLOOD PRESSURE: 74 MMHG | HEIGHT: 65 IN | SYSTOLIC BLOOD PRESSURE: 147 MMHG | RESPIRATION RATE: 16 BRPM | TEMPERATURE: 98.2 F | BODY MASS INDEX: 24.83 KG/M2 | OXYGEN SATURATION: 99 % | WEIGHT: 149 LBS | HEART RATE: 91 BPM

## 2024-03-15 DIAGNOSIS — B20 HIV DISEASE (MULTI): Primary | ICD-10-CM

## 2024-03-15 DIAGNOSIS — B20: ICD-10-CM

## 2024-03-15 DIAGNOSIS — E78.00 PURE HYPERCHOLESTEROLEMIA: ICD-10-CM

## 2024-03-15 DIAGNOSIS — E88.1: ICD-10-CM

## 2024-03-15 DIAGNOSIS — R73.03 PRE-DIABETES: ICD-10-CM

## 2024-03-15 PROCEDURE — 99214 OFFICE O/P EST MOD 30 MIN: CPT | Performed by: INTERNAL MEDICINE

## 2024-03-15 PROCEDURE — 1126F AMNT PAIN NOTED NONE PRSNT: CPT | Performed by: INTERNAL MEDICINE

## 2024-03-15 PROCEDURE — 1160F RVW MEDS BY RX/DR IN RCRD: CPT | Performed by: INTERNAL MEDICINE

## 2024-03-15 PROCEDURE — 1159F MED LIST DOCD IN RCRD: CPT | Performed by: INTERNAL MEDICINE

## 2024-03-15 RX ORDER — ESTRADIOL 0.1 MG/D
1 FILM, EXTENDED RELEASE TRANSDERMAL 2 TIMES WEEKLY
COMMUNITY
Start: 2024-02-22

## 2024-03-15 ASSESSMENT — PAIN SCALES - GENERAL: PAINLEVEL: 0-NO PAIN

## 2024-03-15 NOTE — PROGRESS NOTES
"Subjective   Patient ID: Mishel Wagner is a 74 y.o. female (transgender) who presents for No chief complaint on file..  HPI    Here for follow up HIV infection and dyslipidemia, also genital HSV  Taking meds with good adherence, no missed doses of cabenuva, comes on time every 2 months.  No intolerance or concerns for adverse effects.    Still has lipoatrophy legs and arms, stable from prior visit    Takes atorvastatin, no adverse events and excellent compliance. Has apt with Cardiology    Since last visit with me, no exposure to illnesses or infections.   No intercurrent illnesses or physician's visit  No smoking ETOH, or drugs  In the last 6 months, not sexually active  No symptoms of depression  Does not Work    ROS: All systems checked and are negative except for what is noted in HPI    Objective     Visit Vitals  /74 (BP Location: Right arm, Patient Position: Sitting, BP Cuff Size: Adult)   Pulse 91   Temp 36.8 °C (98.2 °F) (Skin)   Resp 16   Ht 1.651 m (5' 5\")   Wt 67.6 kg (149 lb)   SpO2 99%   BMI 24.79 kg/m²   OB Status Postmenopausal   Smoking Status Some Days   BSA 1.76 m²       Vital signs reviewed  Alert, oriented, no apparent cognitive concerns  HEENT clear  PEERLA  Lungs clear bilaterally  Heart S1S2 regular, no murmur or gallop  Abdomen soft non tender, no masses and no organomegaly  Joints no tenderness, no LOM, no swelling or redness  Neuro exam: no weakness, no sensitive deficit, reflexes normal, cognitive normal  Skin no rash, no redness or lesions    .  Lab Results   Component Value Date    UKG7SEDVC NOT DETECTED 09/06/2023      .  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 Diet and exercise for now    Lipodystrophy, remains with significant limb lipoatrophy but stable    Health maintenance: reviewed weight, diet and exercise, partner notification laws and safe sex. Mammogram in 2024 needed. Pt aware    Quita Caballero MD

## 2024-03-22 ENCOUNTER — APPOINTMENT (OUTPATIENT)
Dept: IMMUNOLOGY | Facility: CLINIC | Age: 74
End: 2024-03-22
Payer: COMMERCIAL

## 2024-04-12 DIAGNOSIS — B20 HIV DISEASE (MULTI): Primary | ICD-10-CM

## 2024-04-18 ENCOUNTER — CLINICAL SUPPORT (OUTPATIENT)
Dept: IMMUNOLOGY | Facility: CLINIC | Age: 74
End: 2024-04-18
Payer: COMMERCIAL

## 2024-04-18 DIAGNOSIS — B20 HIV DISEASE (MULTI): ICD-10-CM

## 2024-04-18 PROCEDURE — 2500000004 HC RX 250 GENERAL PHARMACY W/ HCPCS (ALT 636 FOR OP/ED): Performed by: INTERNAL MEDICINE

## 2024-04-18 PROCEDURE — 96372 THER/PROPH/DIAG INJ SC/IM: CPT | Performed by: INTERNAL MEDICINE

## 2024-04-18 RX ADMIN — CABOTEGRAVIR AND RILPIVIRINE 6 ML: KIT at 13:31

## 2024-04-24 PROBLEM — S99.922A INJURY OF LEFT FOOT: Status: ACTIVE | Noted: 2024-04-24

## 2024-04-24 PROBLEM — D64.9 ANEMIA: Status: ACTIVE | Noted: 2024-04-24

## 2024-04-24 PROBLEM — K64.9 HEMORRHOIDS: Status: ACTIVE | Noted: 2024-04-24

## 2024-04-24 PROBLEM — Z86.19 HISTORY OF SYPHILIS: Status: ACTIVE | Noted: 2023-04-18

## 2024-04-24 PROBLEM — Z86.19 HISTORY OF INFECTIOUS DISEASE: Status: ACTIVE | Noted: 2024-04-24

## 2024-04-24 PROBLEM — K59.00 CONSTIPATION: Status: ACTIVE | Noted: 2024-04-24

## 2024-04-24 PROBLEM — R05.9 COUGH: Status: ACTIVE | Noted: 2024-04-24

## 2024-04-24 PROBLEM — Z86.010 HISTORY OF COLONIC POLYPS: Status: ACTIVE | Noted: 2024-04-24

## 2024-04-24 PROBLEM — K57.30 DIVERTICULOSIS OF LARGE INTESTINE: Status: ACTIVE | Noted: 2023-04-18

## 2024-04-24 PROBLEM — Z86.0100 HISTORY OF COLONIC POLYPS: Status: ACTIVE | Noted: 2024-04-24

## 2024-04-25 ENCOUNTER — OFFICE VISIT (OUTPATIENT)
Dept: CARDIOLOGY | Facility: HOSPITAL | Age: 74
End: 2024-04-25
Payer: COMMERCIAL

## 2024-04-25 VITALS
WEIGHT: 144.6 LBS | OXYGEN SATURATION: 96 % | DIASTOLIC BLOOD PRESSURE: 62 MMHG | HEIGHT: 65 IN | SYSTOLIC BLOOD PRESSURE: 111 MMHG | BODY MASS INDEX: 24.09 KG/M2 | HEART RATE: 95 BPM

## 2024-04-25 DIAGNOSIS — R94.31 ABNORMAL ECG: ICD-10-CM

## 2024-04-25 DIAGNOSIS — Z00.6 RESEARCH STUDY PATIENT: Primary | ICD-10-CM

## 2024-04-25 PROCEDURE — 99213 OFFICE O/P EST LOW 20 MIN: CPT | Performed by: INTERNAL MEDICINE

## 2024-04-25 PROCEDURE — 1160F RVW MEDS BY RX/DR IN RCRD: CPT | Performed by: INTERNAL MEDICINE

## 2024-04-25 PROCEDURE — 1126F AMNT PAIN NOTED NONE PRSNT: CPT | Performed by: INTERNAL MEDICINE

## 2024-04-25 PROCEDURE — 1159F MED LIST DOCD IN RCRD: CPT | Performed by: INTERNAL MEDICINE

## 2024-04-25 PROCEDURE — 93005 ELECTROCARDIOGRAM TRACING: CPT | Performed by: INTERNAL MEDICINE

## 2024-04-25 PROCEDURE — 99203 OFFICE O/P NEW LOW 30 MIN: CPT | Performed by: INTERNAL MEDICINE

## 2024-04-25 ASSESSMENT — PATIENT HEALTH QUESTIONNAIRE - PHQ9
SUM OF ALL RESPONSES TO PHQ9 QUESTIONS 1 AND 2: 0
1. LITTLE INTEREST OR PLEASURE IN DOING THINGS: NOT AT ALL
2. FEELING DOWN, DEPRESSED OR HOPELESS: NOT AT ALL

## 2024-04-25 ASSESSMENT — PAIN SCALES - GENERAL: PAINLEVEL: 0-NO PAIN

## 2024-04-25 NOTE — PROGRESS NOTES
Subjective   Mishel Wagner is a 74 y.o. female.    Past medical history  HIV  Hyperlipidemia on atorvastatin 40   Genital HSV  Cataract  Transgender    Smokin cigarettes a day  Alcohol: Occasional use  Drugs: None  Social history: She lives alone.    Current Outpatient Medications   Medication Instructions    abacavir-lamiVUDine (Epzicom) 600-300 mg tablet 1 tablet, oral, Daily    acyclovir (Zovirax) 200 mg capsule 1 capsule, oral, 2 times daily    aspirin 81 mg, oral, Daily    atorvastatin (LIPITOR) 40 mg, oral, Daily    cabotegravir-rilpivirine (Cabenuva) 600 mg/3 mL- 900 mg/3 mL suspension,extended release INJECT 3 ML OF CABOTEGRAVIR AND 3 ML OF RILPIVIRINE INTO THE MUSCLE EVERY 2 MONTHS.    Climara 0.1 mg/24 hr patch 1 patch, transdermal, Once Weekly    docusate sodium (Colace) 100 mg capsule 1 capsule, oral, 2 times daily PRN    docusate sodium (Colace) 100 mg capsule 1 capsule, oral, 2 times daily    estradiol (Vivelle-DOT) 0.1 mg/24 hr patch 1 patch, transdermal, 2 times weekly, AS DIRECTED    finasteride (PROSCAR) 5 mg, oral, Daily    latanoprost (Xalatan) 0.005 % ophthalmic solution 1 drop, Both Eyes, Nightly    MAGNESIUM OXIDE ORAL 400 mg, oral, 2 times daily    moxifloxacin (Vigamox) 0.5 % ophthalmic solution 1 drop, Left Eye, 4 times daily    prednisoLONE acetate (Pred-Forte) 1 % ophthalmic suspension 1 drop, Left Eye, 4 times daily    RANITIDINE HCL ORAL 300 mg, oral, Daily        Chief Complaint:  Abnormal ECG    HPI  Patient is referred for an abnormal EKG.  Past patient was referred for echocardiogram stress test and cardiac MRI, both were refused.  Patient is doing well.  She has no cardiopulmonary symptoms.  She denies any chest pain, shortness of breath, lower extremity edema, palpitations or syncope/lightheadedness.  She is compliant with the medication    ROS  No other significant complaints  Objective   Constitutional:       Appearance: Healthy appearance.   Neck:      Vascular: JVD normal.    Pulmonary:      Effort: Pulmonary effort is normal.      Breath sounds: Normal breath sounds.   Cardiovascular:      PMI at left midclavicular line. Normal rate. Regular rhythm. Normal S1. Normal S2.       Murmurs: There is no murmur.   Edema:     Peripheral edema absent.   Abdominal:      General: Bowel sounds are normal.      Palpations: Abdomen is soft.   Neurological:      General: No focal deficit present.      Mental Status: Alert and oriented to person, place and time.       Vitals:    04/25/24 1307   BP: 111/62   Pulse: 95   SpO2: 96%     EKG: Sinus rhythm with nonspecific ST changes in the anterolateral and inferior leads    Lab Review:   Lab Results   Component Value Date     02/22/2024    K 4.0 02/22/2024     02/22/2024    CO2 25 02/22/2024    BUN 14 02/22/2024    CREATININE 1.02 02/22/2024    GLUCOSE 68 (L) 02/22/2024    CALCIUM 9.6 02/22/2024     Lab Results   Component Value Date    WBC 6.7 02/22/2024    HGB 10.1 (L) 02/22/2024    HCT 32.8 (L) 02/22/2024    MCV 84 02/22/2024     02/22/2024     Lab Results   Component Value Date    CHOL 197 02/22/2024    TRIG 56 02/22/2024    HDL 76.0 02/22/2024       TTE March 2023  CONCLUSIONS:  1. Left ventricular systolic function is normal with a 65-70% estimated ejection fraction.  2. No left ventricular thrombus visualized.  3. Spectral Doppler shows a pseudonormal pattern of left ventricular diastolic filling.  4. Slightly elevated RVSP.  5. Left ventricular cavity size is decreased.    Assessment/Plan   The primary encounter diagnosis was Abnormal EKG. Diagnoses of Research study patient and Abnormal ECG were also pertinent to this visit.  Patient has no cardiopulmonary symptoms.  History of HIV with no viral load and hyperlipidemia.  Patient is also a smoker.  Patient is asymptomatic.  In the past stress test has been refused by the insurance.  -Very significant EKG changes  -Ordering coronary artery calcium score, if elevated will  refer the patient for a stress test.    Hyperlipidemia: Continue with statin.  Will optimize care if coronary artery calcium score is elevated    Patient is advised to call my office once a coronary artery calcium score.

## 2024-05-31 ENCOUNTER — DOCUMENTATION (OUTPATIENT)
Dept: IMMUNOLOGY | Facility: CLINIC | Age: 74
End: 2024-05-31
Payer: COMMERCIAL

## 2024-05-31 NOTE — PROGRESS NOTES
"Patient complaining of redness and soreness around [facility].  Has been putting bandaids at the site.  RN suggested that she keep the area dry and clean.  Leave open to air.  Asked if there are any sores or blisters or raised areas.  She said, \"no\".  Told her that if the soreness does not go away or any skin problems arise, she would have to come in so RN can assess better.  Patient to call Monday with progress.  "

## 2024-06-04 DIAGNOSIS — K59.00 CONSTIPATION, UNSPECIFIED CONSTIPATION TYPE: ICD-10-CM

## 2024-06-05 RX ORDER — DOCUSATE SODIUM 100 MG/1
100 CAPSULE, LIQUID FILLED ORAL 2 TIMES DAILY
Qty: 180 CAPSULE | Refills: 0 | Status: SHIPPED | OUTPATIENT
Start: 2024-06-05

## 2024-06-06 DIAGNOSIS — B20 HUMAN IMMUNODEFICIENCY VIRUS (MULTI): ICD-10-CM

## 2024-06-06 RX ORDER — CABOTEGRAVIR AND RILPIVIRINE 600-900/3
6 KIT INTRAMUSCULAR SEE ADMIN INSTRUCTIONS
Qty: 6 ML | Refills: 3 | Status: SHIPPED | OUTPATIENT
Start: 2024-06-06

## 2024-06-07 DIAGNOSIS — B20 HIV DISEASE (MULTI): Primary | ICD-10-CM

## 2024-06-13 ENCOUNTER — CLINICAL SUPPORT (OUTPATIENT)
Dept: IMMUNOLOGY | Facility: CLINIC | Age: 74
End: 2024-06-13
Payer: COMMERCIAL

## 2024-06-13 DIAGNOSIS — B20 HIV INFECTION, UNSPECIFIED SYMPTOM STATUS (MULTI): ICD-10-CM

## 2024-06-13 PROCEDURE — 96372 THER/PROPH/DIAG INJ SC/IM: CPT | Performed by: INTERNAL MEDICINE

## 2024-06-13 PROCEDURE — 2500000004 HC RX 250 GENERAL PHARMACY W/ HCPCS (ALT 636 FOR OP/ED): Mod: JZ | Performed by: INTERNAL MEDICINE

## 2024-06-13 NOTE — PROGRESS NOTES
Patient here for cabenuva injections.  Medication delivered by local pharmacy.  Rilpivirine administered to right gluteal muscle without incident.  Cabotegravir administered to left gluteal muscle without incident.  Patient tolerated injections.

## 2024-06-19 DIAGNOSIS — F64.9 GENDER IDENTITY DISORDER, UNSPECIFIED: ICD-10-CM

## 2024-06-19 RX ORDER — ESTRADIOL 0.1 MG/D
1 FILM, EXTENDED RELEASE TRANSDERMAL 2 TIMES WEEKLY
Qty: 24 PATCH | Refills: 1 | Status: SHIPPED | OUTPATIENT
Start: 2024-06-20

## 2024-06-24 ENCOUNTER — HOSPITAL ENCOUNTER (OUTPATIENT)
Dept: RADIOLOGY | Facility: HOSPITAL | Age: 74
Discharge: HOME | End: 2024-06-24
Payer: COMMERCIAL

## 2024-06-24 DIAGNOSIS — R94.31 ABNORMAL ECG: ICD-10-CM

## 2024-06-24 PROCEDURE — 75571 CT HRT W/O DYE W/CA TEST: CPT

## 2024-07-18 ENCOUNTER — APPOINTMENT (OUTPATIENT)
Dept: OPHTHALMOLOGY | Facility: CLINIC | Age: 74
End: 2024-07-18
Payer: COMMERCIAL

## 2024-07-18 DIAGNOSIS — Z96.1 PSEUDOPHAKIA: ICD-10-CM

## 2024-07-18 DIAGNOSIS — H40.1131 PRIMARY OPEN ANGLE GLAUCOMA (POAG) OF BOTH EYES, MILD STAGE: Primary | ICD-10-CM

## 2024-07-18 ASSESSMENT — ENCOUNTER SYMPTOMS
ENDOCRINE NEGATIVE: 0
NEUROLOGICAL NEGATIVE: 0
EYES NEGATIVE: 0
CARDIOVASCULAR NEGATIVE: 0
CONSTITUTIONAL NEGATIVE: 0
RESPIRATORY NEGATIVE: 0
HEMATOLOGIC/LYMPHATIC NEGATIVE: 0
ALLERGIC/IMMUNOLOGIC NEGATIVE: 0
GASTROINTESTINAL NEGATIVE: 0
MUSCULOSKELETAL NEGATIVE: 0
PSYCHIATRIC NEGATIVE: 0

## 2024-07-18 ASSESSMENT — PACHYMETRY
OD_CT(UM): 570
OS_CT(UM): 570

## 2024-07-18 ASSESSMENT — CONF VISUAL FIELD
OS_NORMAL: 1
OS_INFERIOR_NASAL_RESTRICTION: 0
OS_SUPERIOR_TEMPORAL_RESTRICTION: 0
OD_INFERIOR_NASAL_RESTRICTION: 0
OD_NORMAL: 1
OD_INFERIOR_TEMPORAL_RESTRICTION: 0
OS_SUPERIOR_NASAL_RESTRICTION: 0
OD_SUPERIOR_TEMPORAL_RESTRICTION: 0
OD_SUPERIOR_NASAL_RESTRICTION: 0
OS_INFERIOR_TEMPORAL_RESTRICTION: 0

## 2024-07-18 ASSESSMENT — TONOMETRY
IOP_METHOD: GOLDMANN APPLANATION
OS_IOP_MMHG: 14
OD_IOP_MMHG: 17

## 2024-07-18 ASSESSMENT — EXTERNAL EXAM - LEFT EYE: OS_EXAM: NORMAL

## 2024-07-18 ASSESSMENT — SLIT LAMP EXAM - LIDS
COMMENTS: NORMAL
COMMENTS: NORMAL

## 2024-07-18 ASSESSMENT — EXTERNAL EXAM - RIGHT EYE: OD_EXAM: NORMAL

## 2024-07-18 ASSESSMENT — VISUAL ACUITY
OS_SC: 20/50
OD_PH_SC: 20/30
METHOD: SNELLEN - LINEAR
OS_SC+: -1
OD_SC: 20/70

## 2024-07-18 NOTE — PROGRESS NOTES
History    Chief Complaint    Post-op Follow-up       HPI       Post-op Follow-up    In both eyes.  Discomfort includes Negative for pain.  Vision is stable.             Comments    74 Year old female presents for IOP check s/p CE/IOL OU. She states vision is a little blurry. Pt denies eye pain. Pt is not taking any drops.           Last edited by ANDRE Yoder on 7/18/2024  9:35 AM.        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)    Inflammatory liver disease    Hyperlipidemia    HIV disease (CMS/HCC)    Hepatitis C, chronic (CMS/HCC)    Goiter    GERD (gastroesophageal reflux disease)    Genital HSV    Gender dysphoria    Gender dysphoria in adult    Epidermal cyst    Combined form of age-related cataract, both eyes    Central obesity    Bilateral presbyopia    Acquired immunodeficiency syndrome (CMS/HCC)       Past Medical History:   Diagnosis Date    Combined forms of age-related cataract, left eye 10/25/2018    Combined form of age-related cataract, left eye    Combined forms of age-related cataract, right eye 10/25/2018    Combined form of age-related cataract, right eye    Cortical age-related cataract, unspecified eye 06/28/2016    Cataract cortical, senile    Disorder of ligament, unspecified wrist     Disorder of ligament of wrist    Encounter for screening for malignant neoplasm of colon 08/13/2018    Colon cancer screening    Furuncle of back (any part, except buttock) 11/01/2018    Boil, back    Furuncle, unspecified     Boil    Furuncle, unspecified 08/12/2022    Boil    Human  immunodeficiency virus (HIV) disease (Multi) 01/16/2018    HIV infection    Human immunodeficiency virus (HIV) disease (Multi) 01/16/2018    HIV disease    Hyperlipidemia     Hypermetropia, bilateral 10/24/2022    Hyperopia of both eyes with astigmatism and presbyopia    Hypermetropia, unspecified eye 09/18/2018    Hyperopia with astigmatism and presbyopia    Melena 02/16/2015    Blood in stool    Nicotine dependence, cigarettes, uncomplicated 02/04/2019    Cigarette nicotine dependence without complication    Other abnormal and inconclusive findings on diagnostic imaging of breast 10/21/2015    Abnormal finding on mammography    Other conditions influencing health status     Epicondylitis    Other conditions influencing health status 06/25/2021    History of cough    Pain in left shoulder 10/25/2016    Left shoulder pain    Pain in left shoulder 11/21/2016    Left shoulder pain, unspecified chronicity    Pain in right shoulder 11/21/2016    Right shoulder pain    Person consulting for explanation of examination or test findings     Visit for review of DEXA scan    Personal history of infections of the central nervous system     History of meningitis    Personal history of nicotine dependence 08/13/2018    History of nicotine dependence    Personal history of other diseases of the digestive system 04/28/2014    History of constipation    Personal history of other diseases of the musculoskeletal system and connective tissue 05/30/2017    History of low back pain    Personal history of other infectious and parasitic diseases     History of syphilis    Personal history of other infectious and parasitic diseases 04/28/2014    History of herpes simplex infection    Personal history of other specified conditions     History of vomiting    Personal history of other specified conditions     History of abnormal weight loss    Preglaucoma, unspecified, bilateral 01/16/2018    Glaucoma suspect of both eyes    Preglaucoma,  unspecified, unspecified eye 05/30/2017    Glaucoma suspect    Preglaucoma, unspecified, unspecified eye     Glaucoma suspect    Primary open-angle glaucoma, bilateral, moderate stage 01/16/2018    Primary open angle glaucoma of both eyes, moderate stage    Rash and other nonspecific skin eruption     Skin rash    Rash and other nonspecific skin eruption 08/24/2015    Rash    Rash and other nonspecific skin eruption 04/28/2014    Rash     Past Surgical History:   Procedure Laterality Date    COLONOSCOPY  04/28/2014    Colonoscopy (Fiberoptic)    OTHER SURGICAL HISTORY  01/27/2014    Biopsy Of Liver    OTHER SURGICAL HISTORY  01/27/2014    Laryngoscopy (Diagnostic)     SOCIAL HISTORY   SMOKING:  reports that she has been smoking cigarettes. She has never used smokeless tobacco.  DRUG USE:    reports current drug use. Drug: Marijuana.    FAMILY HISTORY  family history includes Alzheimer's disease in her mother; Breast cancer in her mother; Cataracts in her mother; Diabetes in her brother and mother.    CURRENT MEDICATIONS  Current Outpatient Medications (Ophthalmology pharm classes)   Medication Sig Dispense Refill    latanoprost (Xalatan) 0.005 % ophthalmic solution Administer 1 drop into both eyes once daily at bedtime. (Patient not taking: Reported on 3/15/2024) 2.5 mL 6    moxifloxacin (Vigamox) 0.5 % ophthalmic solution Administer 1 drop into the left eye 4 times a day. (Patient not taking: Reported on 3/15/2024) 3 mL 0    prednisoLONE acetate (Pred-Forte) 1 % ophthalmic suspension Administer 1 drop into the left eye 4 times a day. (Patient not taking: Reported on 3/15/2024) 5 mL 0     Current Outpatient Medications (Other)   Medication Sig Dispense Refill    abacavir-lamiVUDine (Epzicom) 600-300 mg tablet Take 1 tablet by mouth once daily.      acyclovir (Zovirax) 200 mg capsule Take 1 capsule (200 mg) by mouth 2 times a day.      aspirin 81 mg EC tablet Take 1 tablet (81 mg) by mouth once daily.       atorvastatin (Lipitor) 40 mg tablet Take 1 tablet (40 mg) by mouth once daily.      cabotegravir-rilpivirine (Cabenuva) 600 mg/3 mL- 900 mg/3 mL suspension,extended release Inject 6 mL into the muscle see administration instructions. Administer 3 mL of cabotegravir and 3 mL of rilpivirine intramuscularly every 2 months. 6 mL 3    Climara 0.1 mg/24 hr patch Place 1 patch over 7 days on the skin 1 (one) time per week. 12 patch 3    docusate sodium (Colace) 100 mg capsule Take 1 capsule (100 mg) by mouth 2 times a day as needed.      docusate sodium (Colace) 100 mg capsule Take 1 capsule (100 mg) by mouth 2 times a day. 180 capsule 0    estradiol (Vivelle-DOT) 0.1 mg/24 hr patch APPLY 1 PATCH TO SKIN 2 TIMES EVERY WEEK AS DIRECTED 24 patch 1    finasteride (Proscar) 5 mg tablet Take 1 tablet (5 mg) by mouth once daily. 60 tablet 4    MAGNESIUM OXIDE ORAL Take 400 mg by mouth twice a day.      RANITIDINE HCL ORAL Take 300 mg by mouth once daily.         Exam   Visual Acuity (Snellen - Linear)         Right Left    Dist sc 20/70 20/50 -1    Dist ph sc 20/30               Edited by: Sue Rodriguez, COT              Not recorded       Not recorded       Pupils       Pupils         Pupils    Right PERRL, No APD    Left PERRL, No APD                  Intraocular pressure was 15 in the right eye and 16 in the left eye using Goldmann Applanation.  Extraocular Movement       Extraocular Movement         Right Left     Full Full                  Not recorded       Not recorded        External Exam         Right Left    External Normal Normal              Slit Lamp Exam         Right Left    Lids/Lashes Normal Normal    Conjunctiva/Sclera White and quiet White and quiet    Cornea Clear Clear    Anterior Chamber Deep and quiet Deep and quiet    Iris Round and reactive Round and reactive    Lens PCIOL well centered in the bag PCIOL well centered in the bag    Anterior Vitreous Normal Normal              Fundus Exam          "Right Left    Macula  flat                   <div id=\"MAIN_[email or web address]\"></div>       Diagnostics                Plan   -  The primary encounter diagnosis was Primary open angle glaucoma (POAG) of both eyes, mild stage. A diagnosis of Pseudophakia was also pertinent to this visit.  -  Referred By:  Radha Coombs MD  -  IOP:  Last Tonometry OD 15 / OS 16 /Date 9:39 AM      Target OD: No Value exists for the : EPIC#DWP385 Target OS: No Value exists for the : EPIC#AHC334       Max pressure OD:   Date:         Max Pressure OS:   Date:    -    Not recorded         -    Not recorded       -  Pathophysiology of glaucoma, and potential blinding nature of disease reviewed.      Importance of follow up and compliance stressed  Overall patient is low risk  S/p phaco abic OU, patient now off drops  IOP stable  Rtc 6 months for HVF 24-2 (new mrx!), dfe, oct rnfl          "

## 2024-07-26 ENCOUNTER — TELEPHONE (OUTPATIENT)
Dept: IMMUNOLOGY | Facility: CLINIC | Age: 74
End: 2024-07-26
Payer: COMMERCIAL

## 2024-07-26 DIAGNOSIS — F64.9 GENDER DYSPHORIA: ICD-10-CM

## 2024-07-29 RX ORDER — FINASTERIDE 5 MG/1
5 TABLET, FILM COATED ORAL DAILY
Qty: 90 TABLET | Refills: 1 | Status: SHIPPED | OUTPATIENT
Start: 2024-07-29

## 2024-08-02 DIAGNOSIS — E78.00 PURE HYPERCHOLESTEROLEMIA: Primary | ICD-10-CM

## 2024-08-02 DIAGNOSIS — B20 HIV DISEASE (MULTI): Primary | ICD-10-CM

## 2024-08-02 RX ORDER — ATORVASTATIN CALCIUM 40 MG/1
40 TABLET, FILM COATED ORAL DAILY
Qty: 90 TABLET | Refills: 0 | Status: SHIPPED | OUTPATIENT
Start: 2024-08-02

## 2024-08-08 ENCOUNTER — CLINICAL SUPPORT (OUTPATIENT)
Dept: IMMUNOLOGY | Facility: CLINIC | Age: 74
End: 2024-08-08
Payer: COMMERCIAL

## 2024-08-08 DIAGNOSIS — E66.9 OBESITY (BMI 30-39.9): ICD-10-CM

## 2024-08-08 DIAGNOSIS — Z59.41 FOOD INSECURITY: ICD-10-CM

## 2024-08-08 DIAGNOSIS — E78.5 HYPERLIPIDEMIA, UNSPECIFIED HYPERLIPIDEMIA TYPE: ICD-10-CM

## 2024-08-08 DIAGNOSIS — K59.00 CONSTIPATION, UNSPECIFIED CONSTIPATION TYPE: ICD-10-CM

## 2024-08-08 DIAGNOSIS — Z11.3 ROUTINE SCREENING FOR STI (SEXUALLY TRANSMITTED INFECTION): ICD-10-CM

## 2024-08-08 DIAGNOSIS — E13.69 OTHER SPECIFIED DIABETES MELLITUS WITH OTHER SPECIFIED COMPLICATION, UNSPECIFIED WHETHER LONG TERM INSULIN USE (MULTI): ICD-10-CM

## 2024-08-08 DIAGNOSIS — Z79.899 HIGH RISK MEDICATION USE: ICD-10-CM

## 2024-08-08 DIAGNOSIS — B20 HIV INFECTION, UNSPECIFIED SYMPTOM STATUS (MULTI): ICD-10-CM

## 2024-08-08 LAB
ALBUMIN SERPL BCP-MCNC: 4 G/DL (ref 3.4–5)
ALP SERPL-CCNC: 55 U/L (ref 33–136)
ALT SERPL W P-5'-P-CCNC: 8 U/L (ref 7–45)
ANION GAP SERPL CALC-SCNC: 13 MMOL/L (ref 10–20)
AST SERPL W P-5'-P-CCNC: 17 U/L (ref 9–39)
BASOPHILS # BLD AUTO: 0.06 X10*3/UL (ref 0–0.1)
BASOPHILS # BLD AUTO: 0.07 X10*3/UL (ref 0–0.1)
BASOPHILS NFR BLD AUTO: 0.8 %
BASOPHILS NFR BLD AUTO: 0.9 %
BILIRUB SERPL-MCNC: 0.4 MG/DL (ref 0–1.2)
BUN SERPL-MCNC: 10 MG/DL (ref 6–23)
CALCIUM SERPL-MCNC: 9.4 MG/DL (ref 8.6–10.6)
CD3+CD4+ CELLS # BLD: 1.06 X10E9/L
CD3+CD4+ CELLS # BLD: 1063 /MM3
CD3+CD4+ CELLS NFR BLD: 49 %
CD3+CD4+ CELLS/CD3+CD8+ CLL BLD: 1.69 %
CD3+CD8+ CELLS # BLD: 0.63 X10E9/L
CD3+CD8+ CELLS NFR BLD: 29 %
CHLORIDE SERPL-SCNC: 100 MMOL/L (ref 98–107)
CHOLEST SERPL-MCNC: 196 MG/DL (ref 0–199)
CHOLESTEROL/HDL RATIO: 2.3
CO2 SERPL-SCNC: 27 MMOL/L (ref 21–32)
CREAT SERPL-MCNC: 1.04 MG/DL (ref 0.5–1.05)
EGFRCR SERPLBLD CKD-EPI 2021: 57 ML/MIN/1.73M*2
EOSINOPHIL # BLD AUTO: 0.02 X10*3/UL (ref 0–0.4)
EOSINOPHIL # BLD AUTO: 0.03 X10*3/UL (ref 0–0.4)
EOSINOPHIL NFR BLD AUTO: 0.3 %
EOSINOPHIL NFR BLD AUTO: 0.4 %
ERYTHROCYTE [DISTWIDTH] IN BLOOD BY AUTOMATED COUNT: 17.6 % (ref 11.5–14.5)
ERYTHROCYTE [DISTWIDTH] IN BLOOD BY AUTOMATED COUNT: 17.7 % (ref 11.5–14.5)
EST. AVERAGE GLUCOSE BLD GHB EST-MCNC: 117 MG/DL
GLUCOSE SERPL-MCNC: 94 MG/DL (ref 74–99)
HBA1C MFR BLD: 5.7 %
HCT VFR BLD AUTO: 32.6 % (ref 36–46)
HCT VFR BLD AUTO: 33.5 % (ref 36–46)
HDLC SERPL-MCNC: 85.3 MG/DL
HGB BLD-MCNC: 10.3 G/DL (ref 12–16)
HGB BLD-MCNC: 10.5 G/DL (ref 12–16)
IMM GRANULOCYTES # BLD AUTO: 0.01 X10*3/UL (ref 0–0.5)
IMM GRANULOCYTES # BLD AUTO: 0.01 X10*3/UL (ref 0–0.5)
IMM GRANULOCYTES NFR BLD AUTO: 0.1 % (ref 0–0.9)
IMM GRANULOCYTES NFR BLD AUTO: 0.1 % (ref 0–0.9)
LDLC SERPL CALC-MCNC: 102 MG/DL
LYMPHOCYTES # BLD AUTO: 2.17 X10*3/UL (ref 0.8–3)
LYMPHOCYTES # BLD AUTO: 2.27 X10*3/UL (ref 0.8–3)
LYMPHOCYTES # SPEC AUTO: 2.17 X10*3/UL
LYMPHOCYTES NFR BLD AUTO: 29.2 %
LYMPHOCYTES NFR BLD AUTO: 29.2 %
MCH RBC QN AUTO: 26 PG (ref 26–34)
MCH RBC QN AUTO: 26.1 PG (ref 26–34)
MCHC RBC AUTO-ENTMCNC: 31.3 G/DL (ref 32–36)
MCHC RBC AUTO-ENTMCNC: 31.6 G/DL (ref 32–36)
MCV RBC AUTO: 83 FL (ref 80–100)
MCV RBC AUTO: 83 FL (ref 80–100)
MONOCYTES # BLD AUTO: 0.48 X10*3/UL (ref 0.05–0.8)
MONOCYTES # BLD AUTO: 0.52 X10*3/UL (ref 0.05–0.8)
MONOCYTES NFR BLD AUTO: 6.5 %
MONOCYTES NFR BLD AUTO: 6.7 %
NEUTROPHILS # BLD AUTO: 4.69 X10*3/UL (ref 1.6–5.5)
NEUTROPHILS # BLD AUTO: 4.88 X10*3/UL (ref 1.6–5.5)
NEUTROPHILS NFR BLD AUTO: 62.8 %
NEUTROPHILS NFR BLD AUTO: 63 %
NON HDL CHOLESTEROL: 111 MG/DL (ref 0–149)
NRBC BLD-RTO: 0 /100 WBCS (ref 0–0)
NRBC BLD-RTO: 0 /100 WBCS (ref 0–0)
PLATELET # BLD AUTO: 302 X10*3/UL (ref 150–450)
PLATELET # BLD AUTO: 303 X10*3/UL (ref 150–450)
POTASSIUM SERPL-SCNC: 3.8 MMOL/L (ref 3.5–5.3)
PROT SERPL-MCNC: 7.2 G/DL (ref 6.4–8.2)
RBC # BLD AUTO: 3.94 X10*6/UL (ref 4–5.2)
RBC # BLD AUTO: 4.04 X10*6/UL (ref 4–5.2)
SODIUM SERPL-SCNC: 136 MMOL/L (ref 136–145)
TRIGL SERPL-MCNC: 44 MG/DL (ref 0–149)
VLDL: 9 MG/DL (ref 0–40)
WBC # BLD AUTO: 7.4 X10*3/UL (ref 4.4–11.3)
WBC # BLD AUTO: 7.8 X10*3/UL (ref 4.4–11.3)

## 2024-08-08 PROCEDURE — 87536 HIV-1 QUANT&REVRSE TRNSCRPJ: CPT

## 2024-08-08 PROCEDURE — 83036 HEMOGLOBIN GLYCOSYLATED A1C: CPT

## 2024-08-08 PROCEDURE — 2500000004 HC RX 250 GENERAL PHARMACY W/ HCPCS (ALT 636 FOR OP/ED): Mod: JZ | Performed by: INTERNAL MEDICINE

## 2024-08-08 PROCEDURE — 85025 COMPLETE CBC W/AUTO DIFF WBC: CPT

## 2024-08-08 PROCEDURE — 84075 ASSAY ALKALINE PHOSPHATASE: CPT

## 2024-08-08 PROCEDURE — 87491 CHLMYD TRACH DNA AMP PROBE: CPT

## 2024-08-08 PROCEDURE — 96372 THER/PROPH/DIAG INJ SC/IM: CPT | Performed by: INTERNAL MEDICINE

## 2024-08-08 PROCEDURE — 88185 FLOWCYTOMETRY/TC ADD-ON: CPT

## 2024-08-08 PROCEDURE — 84478 ASSAY OF TRIGLYCERIDES: CPT

## 2024-08-08 PROCEDURE — 36415 COLL VENOUS BLD VENIPUNCTURE: CPT

## 2024-08-08 RX ORDER — ADHESIVE BANDAGE
30 BANDAGE TOPICAL DAILY PRN
Qty: 360 ML | Refills: 0 | Status: SHIPPED | OUTPATIENT
Start: 2024-08-08

## 2024-08-08 RX ORDER — DOCUSATE SODIUM 100 MG/1
100 CAPSULE, LIQUID FILLED ORAL 2 TIMES DAILY
Qty: 60 CAPSULE | Refills: 5 | Status: SHIPPED | OUTPATIENT
Start: 2024-08-08

## 2024-08-08 SDOH — ECONOMIC STABILITY - FOOD INSECURITY: FOOD INSECURITY: Z59.41

## 2024-08-08 NOTE — PROGRESS NOTES
Here for Cabenuva injections.  Meds delivered by patient's local pharmacy.  Rilpivirine administered to right gluteal muscle without incident.  Cabotegravir administered to left  gluteal muscle without incident.  Patient tolerated injections.  Labs drawn to check efficay of medications.

## 2024-08-08 NOTE — PROGRESS NOTES
Patient arrived in clinic to receive Cabenuva injection. She reported to RN Rosi persistent constipation despite use of docusate, fiber, and prune juice. RN discussed with patient a trial of milk of magnesia in addition to her current regimen. Pt agreed, requested prescription to be sent to local [facility].

## 2024-08-09 LAB
C TRACH RRNA SPEC QL NAA+PROBE: NEGATIVE
HIV1 RNA # PLAS NAA DL=20: NOT DETECTED {COPIES}/ML
HIV1 RNA SPEC NAA+PROBE-LOG#: NORMAL {LOG_COPIES}/ML
N GONORRHOEA DNA SPEC QL PROBE+SIG AMP: NEGATIVE

## 2024-09-06 ENCOUNTER — CLINICAL SUPPORT (OUTPATIENT)
Dept: NUTRITION | Facility: HOSPITAL | Age: 74
End: 2024-09-06
Payer: COMMERCIAL

## 2024-09-06 DIAGNOSIS — Z59.41 FOOD INSECURITY: ICD-10-CM

## 2024-09-06 SDOH — ECONOMIC STABILITY - FOOD INSECURITY: FOOD INSECURITY: Z59.41

## 2024-09-06 NOTE — PROGRESS NOTES
[facility] [employer]  Diet Recommendation 1: Healthy Eating  Other Diet Recommendations: Anemia, Prediabetes  Food Intolerance Avoidance: NKFA  Household Size: 1 Family Member  Interventions: Referral Number: 1st 6 Mo Referral 6 Mos  Interventions: Visit Number: 1 of 6 Visits - Max 6 Visits/Referral Each 6 Mo Period  Education Today: Healthy Recipes  Follow Up Notes for Future Visits: Loves to cook. Does not follow any special diet. Says she tried cutting out sugary items like pop when diagnosed with prediabetes but it didn't make a difference so she stopped. Discussed.  Fruit: % Fresh  Vegetables: 50-75% Fresh  Proteins: 0 Plant-based Items  Originating Site of Referral Order: [id number]  Initials of RD Assisting Today: STACEY

## 2024-09-12 DIAGNOSIS — E78.00 PURE HYPERCHOLESTEROLEMIA: ICD-10-CM

## 2024-09-12 RX ORDER — ATORVASTATIN CALCIUM 40 MG/1
40 TABLET, FILM COATED ORAL DAILY
Qty: 90 TABLET | Refills: 2 | Status: SHIPPED | OUTPATIENT
Start: 2024-09-12

## 2024-09-13 ENCOUNTER — APPOINTMENT (OUTPATIENT)
Dept: IMMUNOLOGY | Facility: CLINIC | Age: 74
End: 2024-09-13
Payer: COMMERCIAL

## 2024-09-20 ENCOUNTER — OFFICE VISIT (OUTPATIENT)
Dept: IMMUNOLOGY | Facility: CLINIC | Age: 74
End: 2024-09-20
Payer: COMMERCIAL

## 2024-09-20 VITALS
HEART RATE: 81 BPM | BODY MASS INDEX: 23.13 KG/M2 | WEIGHT: 138.8 LBS | SYSTOLIC BLOOD PRESSURE: 119 MMHG | RESPIRATION RATE: 16 BRPM | TEMPERATURE: 97.9 F | HEIGHT: 65 IN | DIASTOLIC BLOOD PRESSURE: 70 MMHG | OXYGEN SATURATION: 99 %

## 2024-09-20 DIAGNOSIS — Z23 NEED FOR VACCINATION: ICD-10-CM

## 2024-09-20 DIAGNOSIS — D23.5 DERMOID CYST OF SKIN OF BACK: ICD-10-CM

## 2024-09-20 PROCEDURE — 90677 PCV20 VACCINE IM: CPT | Performed by: INTERNAL MEDICINE

## 2024-09-20 PROCEDURE — 90662 IIV NO PRSV INCREASED AG IM: CPT | Performed by: INTERNAL MEDICINE

## 2024-09-20 PROCEDURE — 99214 OFFICE O/P EST MOD 30 MIN: CPT | Performed by: INTERNAL MEDICINE

## 2024-09-20 ASSESSMENT — PAIN SCALES - GENERAL: PAINLEVEL: 0-NO PAIN

## 2024-09-20 NOTE — PROGRESS NOTES
"Subjective     Mishel Wagner is a 74 y.o. female (transgender) with HIV, dyslipidemia and genital HSV. On Cabenuva every 2 months. Here for follow up, was last seen in [facility] in 3/2024     Taking meds, cabenuva, with good adherence, no missed doses. No intolerance or concerns for adverse effects.  Since seen, no exposure to illnesses or infections.   No intercurrent illnesses or physician's visit  No smoking, no excessive alcohol or other drugs  No symptoms of depression    ROS: All systems checked and are negative except for has some discomfort in her back at the site of a small cyst--noticed about 2 months ago    Objective     Visit Vitals  /70 (BP Location: Right arm, Patient Position: Sitting, BP Cuff Size: Large adult)   Pulse 81   Temp 36.6 °C (97.9 °F) (Temporal)   Resp 16   Ht 1.651 m (5' 5\")   Wt 63 kg (138 lb 12.8 oz)   SpO2 99%   BMI 23.10 kg/m²   OB Status Postmenopausal   Smoking Status Some Days   BSA 1.7 m²       Vital signs reviewed  Alert, oriented, no apparent cognitive concerns  HEENT clear  PEERLA  Lungs clear bilaterally  Heart S1S2 regular, no murmur or gallop  Abdomen soft non tender, no masses and no organomegaly  Joints no tenderness, no LOM, no swelling or redness  Neuro exam: no weakness, no sensitive deficit, reflexes normal, cognitive normal  Skin no rash, except for small cystic lesion , small about 0.5 X 0.5 cm in back, adjacent to a small mole   .  Lab Results   Component Value Date    NCU3ORRVL NOT DETECTED 09/06/2023      .  Lab Results   Component Value Date    WBC 7.8 08/08/2024    WBC 7.4 08/08/2024    HGB 10.5 (L) 08/08/2024    HGB 10.3 (L) 08/08/2024    HCT 33.5 (L) 08/08/2024    HCT 32.6 (L) 08/08/2024     08/08/2024     08/08/2024    CHOL 196 08/08/2024    TRIG 44 08/08/2024    HDL 85.3 08/08/2024    ALT 8 08/08/2024    AST 17 08/08/2024     08/08/2024    K 3.8 08/08/2024     08/08/2024    CREATININE 1.04 08/08/2024    BUN 10 08/08/2024    CO2 27 " 08/08/2024    TSH 2.82 08/02/2022    PSA <0.10 10/01/2019    INR 1.0 01/15/2024    HGBA1C 5.7 (H) 08/08/2024         Assessment/Plan   Problem List Items Addressed This Visit    None      HIV: well controlled clinically, last labs reviewed with patient, viral load undetectable, toxicity labs reviewed, no concerns  Labs in 6 months unless any changes or new concerns, including CD4, viral load, and toxicity labs    Dyslipidemias: fasting lipid profile on next visit, continue statins as prescribed    Health maintenance: reviewed weight, need flu and PCV 20 vaccinations today  Also will refer to derm for assessment and removal of the small cyst in her back    Quita Caballero MD

## 2024-09-26 DIAGNOSIS — B20 HIV DISEASE (MULTI): Primary | ICD-10-CM

## 2024-10-03 ENCOUNTER — CLINICAL SUPPORT (OUTPATIENT)
Dept: IMMUNOLOGY | Facility: CLINIC | Age: 74
End: 2024-10-03
Payer: COMMERCIAL

## 2024-10-03 DIAGNOSIS — B20 HIV DISEASE (MULTI): ICD-10-CM

## 2024-10-03 PROCEDURE — 96372 THER/PROPH/DIAG INJ SC/IM: CPT | Performed by: INTERNAL MEDICINE

## 2024-10-03 PROCEDURE — 2500000004 HC RX 250 GENERAL PHARMACY W/ HCPCS (ALT 636 FOR OP/ED): Mod: JZ | Performed by: INTERNAL MEDICINE

## 2024-10-03 NOTE — PROGRESS NOTES
Patient here for cabenuva injections. Meds delivered by patient's local pharmacy.  Rilpivirine administered to right gluteal muscle without incident. Cabotegravir administered to left gluteal muscle without incident.  Tolerated injections.

## 2024-10-07 ENCOUNTER — CLINICAL SUPPORT (OUTPATIENT)
Dept: NUTRITION | Facility: HOSPITAL | Age: 74
End: 2024-10-07
Payer: COMMERCIAL

## 2024-10-07 NOTE — PROGRESS NOTES
[employer] For [employer]  Diet Recommendation 1: Healthy Eating  Other Diet Recommendations: Anemia, Prediabetes  Food Intolerance Avoidance: NKFA  Household Size: 1 Family Member  Interventions: Referral Number: 1st 6 Mo Referral 6 Mos  Interventions: Visit Number: 2 of 6 Visits - Max 6 Visits/Referral Each 6 Mo Period  Education Today: Healthy Recipes  Follow Up Notes for Future Visits: No particular health concerns or nutrition interests.  Grains: 25-50% Whole  Fruit: % Fresh  Vegetables: 50-75% Fresh  Proteins: 0 Plant-based Items  Dairy: 0-25% Lowfat  Originating Site of Referral Order: [id number]  Initials of RD Assisting Today: GOPI

## 2024-11-21 DIAGNOSIS — F64.9 GENDER DYSPHORIA: ICD-10-CM

## 2024-11-22 RX ORDER — FINASTERIDE 5 MG/1
5 TABLET, FILM COATED ORAL DAILY
Qty: 90 TABLET | Refills: 0 | Status: SHIPPED | OUTPATIENT
Start: 2024-11-22

## 2024-11-25 DIAGNOSIS — B20 HIV DISEASE (MULTI): Primary | ICD-10-CM

## 2024-11-27 DIAGNOSIS — B20 HIV DISEASE (MULTI): Primary | ICD-10-CM

## 2024-11-27 DIAGNOSIS — B20 HUMAN IMMUNODEFICIENCY VIRUS (MULTI): ICD-10-CM

## 2024-11-27 RX ORDER — CABOTEGRAVIR AND RILPIVIRINE 600-900/3
6 KIT INTRAMUSCULAR SEE ADMIN INSTRUCTIONS
Qty: 6 ML | Refills: 3 | Status: SHIPPED | OUTPATIENT
Start: 2024-11-27

## 2024-12-03 ENCOUNTER — CLINICAL SUPPORT (OUTPATIENT)
Dept: IMMUNOLOGY | Facility: CLINIC | Age: 74
End: 2024-12-03
Payer: COMMERCIAL

## 2024-12-03 DIAGNOSIS — B20 HIV DISEASE (MULTI): ICD-10-CM

## 2024-12-03 PROCEDURE — 2500000004 HC RX 250 GENERAL PHARMACY W/ HCPCS (ALT 636 FOR OP/ED): Mod: JZ | Performed by: INTERNAL MEDICINE

## 2024-12-03 PROCEDURE — 96372 THER/PROPH/DIAG INJ SC/IM: CPT | Performed by: INTERNAL MEDICINE

## 2024-12-03 NOTE — PROGRESS NOTES
Pt given 3 mls of cabotegravir in left ventrogluteal and 3 mls of rilpivirine into right ventrogluteal. Both were tolerated well and pt given date and time of next injection.

## 2024-12-09 ENCOUNTER — DOCUMENTATION (OUTPATIENT)
Dept: IMMUNOLOGY | Facility: CLINIC | Age: 74
End: 2024-12-09
Payer: COMMERCIAL

## 2024-12-09 NOTE — PROGRESS NOTES
Patient would like to reschedule her mammogram to after the Holidays.  Has one scheduled for December 18, 2024.  RN rescheduled appointment to January 6, 2025 at 10:45am, [facility].  Patient made aware of her new appointment.

## 2024-12-18 ENCOUNTER — APPOINTMENT (OUTPATIENT)
Dept: RADIOLOGY | Facility: HOSPITAL | Age: 74
End: 2024-12-18
Payer: COMMERCIAL

## 2024-12-18 DIAGNOSIS — Z12.31 SCREENING MAMMOGRAM FOR BREAST CANCER: ICD-10-CM

## 2024-12-18 NOTE — PROGRESS NOTES
FOLLOW-UP VISIT       HISTORY OF PRESENT ILLNESS:   Mishel Wagner is a 74 y.o. female who presents to me today for follow-up of gender dysphoria and medication refills.    The patient continues to take Proscar 5 mg tablets as well as estradiol 0.1 mg patches which works well for her. No side effects or issues since last visit.    HIV well controlled and most recently undetectable    PAST MEDICAL HISTORY:  Past Medical History:   Diagnosis Date    Combined forms of age-related cataract, left eye 10/25/2018    Combined form of age-related cataract, left eye    Combined forms of age-related cataract, right eye 10/25/2018    Combined form of age-related cataract, right eye    Cortical age-related cataract, unspecified eye 06/28/2016    Cataract cortical, senile    Disorder of ligament, unspecified wrist     Disorder of ligament of wrist    Encounter for screening for malignant neoplasm of colon 08/13/2018    Colon cancer screening    Furuncle of back (any part, except buttock) 11/01/2018    Boil, back    Furuncle, unspecified     Boil    Furuncle, unspecified 08/12/2022    Boil    Human immunodeficiency virus (HIV) disease (Multi) 01/16/2018    HIV infection    Human immunodeficiency virus (HIV) disease (Multi) 01/16/2018    HIV disease    Hyperlipidemia     Hypermetropia, bilateral 10/24/2022    Hyperopia of both eyes with astigmatism and presbyopia    Hypermetropia, unspecified eye 09/18/2018    Hyperopia with astigmatism and presbyopia    Melena 02/16/2015    Blood in stool    Nicotine dependence, cigarettes, uncomplicated 02/04/2019    Cigarette nicotine dependence without complication    Other abnormal and inconclusive findings on diagnostic imaging of breast 10/21/2015    Abnormal finding on mammography    Other conditions influencing health status     Epicondylitis    Other conditions influencing health status 06/25/2021    History of cough    Pain in left shoulder 10/25/2016    Left shoulder pain    Pain in left  shoulder 11/21/2016    Left shoulder pain, unspecified chronicity    Pain in right shoulder 11/21/2016    Right shoulder pain    Person consulting for explanation of examination or test findings     Visit for review of DEXA scan    Personal history of infections of the central nervous system     History of meningitis    Personal history of nicotine dependence 08/13/2018    History of nicotine dependence    Personal history of other diseases of the digestive system 04/28/2014    History of constipation    Personal history of other diseases of the musculoskeletal system and connective tissue 05/30/2017    History of low back pain    Personal history of other infectious and parasitic diseases     History of syphilis    Personal history of other infectious and parasitic diseases 04/28/2014    History of herpes simplex infection    Personal history of other specified conditions     History of vomiting    Personal history of other specified conditions     History of abnormal weight loss    Preglaucoma, unspecified, bilateral 01/16/2018    Glaucoma suspect of both eyes    Preglaucoma, unspecified, unspecified eye 05/30/2017    Glaucoma suspect    Preglaucoma, unspecified, unspecified eye     Glaucoma suspect    Primary open-angle glaucoma, bilateral, moderate stage 01/16/2018    Primary open angle glaucoma of both eyes, moderate stage    Rash and other nonspecific skin eruption     Skin rash    Rash and other nonspecific skin eruption 08/24/2015    Rash    Rash and other nonspecific skin eruption 04/28/2014    Rash       PAST SURGICAL HISTORY:  Past Surgical History:   Procedure Laterality Date    COLONOSCOPY  04/28/2014    Colonoscopy (Fiberoptic)    OTHER SURGICAL HISTORY  01/27/2014    Biopsy Of Liver    OTHER SURGICAL HISTORY  01/27/2014    Laryngoscopy (Diagnostic)        ALLERGIES:   Allergies   Allergen Reactions    Amoxicillin Unknown    Efavirenz Dizziness    Fenofibrate Nausea Only    Ritonavir Unknown         MEDICATIONS:   Medication Documentation Review Audit       Reviewed by Ana Quiroga MA (Medical Assistant) on 24 at 1320      Medication Order Taking? Sig Documenting Provider Last Dose Status   acyclovir (Zovirax) 200 mg capsule [id number] Yes Take 1 capsule (200 mg) by mouth 2 times a day. Historical Provider, MD  Active   aspirin 81 mg EC tablet [id number] Yes Take 1 tablet (81 mg) by mouth once daily. Historical Provider, MD  Active   atorvastatin (Lipitor) 40 mg tablet [id number] Yes TAKE 1 TABLET BY MOUTH EVERY DAY Quita Caballero MD  Active   cabotegravir-rilpivirine (Cabenuva) 600 mg/3 mL- 900 mg/3 mL suspension,extended release [id number] Yes Inject 6 mL into the muscle see administration instructions. Administer 3 mL of cabotegravir and 3 mL of rilpivirine intramuscularly every 2 months. Quita Caballero MD  Active   Climara 0.1 mg/24 hr patch [id number]  Place 1 patch over 7 days on the skin 1 (one) time per week.   Patient not taking: Reported on 2024    Penelope Escobedo MD MPH   24 2359   docusate sodium (Colace) 100 mg capsule [id number] Yes Take 1 capsule (100 mg) by mouth 2 times a day. Quita Caballero MD  Active   estradiol (Vivelle-DOT) 0.1 mg/24 hr patch [id number] Yes APPLY 1 PATCH TO SKIN 2 TIMES EVERY WEEK AS DIRECTED Anahi Riggs, APRN-CNP  Active   finasteride (Proscar) 5 mg tablet [id number] Yes TAKE 1 TABLET BY MOUTH EVERY DAY Penelope Escobedo MD MPH  Active   magnesium hydroxide (Milk of Magnesia) 400 mg/5 mL suspension [id number]  Take 30 mL by mouth once daily as needed for constipation.   Patient not taking: Reported on 2024    Quita Caballero MD  Active   MAGNESIUM OXIDE ORAL [id number] Yes Take 400 mg by mouth twice a day. Historical Provider, MD  Active   RANITIDINE HCL ORAL [id number] Yes Take 300 mg by mouth once daily. Historical Provider, MD  Active                     SOCIAL HISTORY:  Patient  reports that she has been smoking cigarettes. She has never  "used smokeless tobacco. She reports current alcohol use. She reports current drug use. Drug: Marijuana.   Social History     Socioeconomic History    Marital status: Single     Spouse name: Not on file    Number of children: Not on file    Years of education: Not on file    Highest education level: Not on file   Occupational History    Not on file   Tobacco Use    Smoking status: Some Days     Types: Cigarettes    Smokeless tobacco: Never   Vaping Use    Vaping status: Never Used   Substance and Sexual Activity    Alcohol use: Yes     Comment: occasionally    Drug use: Yes     Types: Marijuana    Sexual activity: Not Currently   Other Topics Concern    Not on file   Social History Narrative    Not on file     Social Drivers of Health     Financial Resource Strain: Not on file   Food Insecurity: Not on file   Transportation Needs: Not on file   Physical Activity: Not on file   Stress: Not on file   Social Connections: Not on file   Intimate Partner Violence: Not on file   Housing Stability: Not on file       FAMILY HISTORY:  Family History   Problem Relation Name Age of Onset    Alzheimer's disease Mother      Cataracts Mother      Diabetes Mother      Breast cancer Mother          Unknow age of onset    Diabetes Brother         REVIEW OF SYSTEMS:  Constitutional: Negative for fever and chills. Denies anorexia, weight loss.  Eyes: Negative for visual disturbance.   Respiratory: Negative for shortness of breath.    Cardiovascular: Negative for chest pain.   Gastrointestinal: Negative for nausea and vomiting.   Genitourinary: See interval history above.  Skin: Negative for rash.   Neurological: Negative for dizziness and numbness.   Psychiatric/Behavioral: Negative for confusion and decreased concentration.     PHYSICAL EXAM:  Blood pressure 127/69, pulse 79, temperature 36.1 °C (96.9 °F), height 1.651 m (5' 5\"), weight 63 kg (139 lb).  Constitutional: Patient appears well-developed and well-nourished. No distress.  "   Head: Normocephalic and atraumatic.    Neck: Normal range of motion.    Cardiovascular: Normal rate.    Pulmonary/Chest: Effort normal. No respiratory distress.   Musculoskeletal: Normal range of motion.    Neurological: Alert and oriented to person, place, and time.  Psychiatric: Normal mood and affect. Behavior is normal. Thought content normal.       Assessment:      1. Gender identity disorder, unspecified  estradiol (Vivelle-DOT) 0.1 mg/24 hr patch      2. Gender dysphoria  finasteride (Proscar) 5 mg tablet          Mishel Wagner is a 74 y.o. female with gender dysphoria.      Plan:   Prescription for estradiol 0.1 mg patch sent to the patient's pharmacy.   Prescription for finasteride 5 mg tablets sent to the patient's pharmacy.    Follow-up in 1 year.        Paris Jackson MD, Gynecologist  Female Reconstruction & Sexual Medicine Fellow  Dept of Urology/OBGYN  12/19/2024

## 2024-12-19 ENCOUNTER — APPOINTMENT (OUTPATIENT)
Dept: UROLOGY | Facility: CLINIC | Age: 74
End: 2024-12-19
Payer: COMMERCIAL

## 2024-12-19 VITALS
HEART RATE: 79 BPM | DIASTOLIC BLOOD PRESSURE: 69 MMHG | TEMPERATURE: 96.9 F | BODY MASS INDEX: 23.16 KG/M2 | SYSTOLIC BLOOD PRESSURE: 127 MMHG | WEIGHT: 139 LBS | HEIGHT: 65 IN

## 2024-12-19 DIAGNOSIS — F64.9 GENDER DYSPHORIA: ICD-10-CM

## 2024-12-19 DIAGNOSIS — F64.9 GENDER IDENTITY DISORDER, UNSPECIFIED: ICD-10-CM

## 2024-12-19 RX ORDER — ESTRADIOL 0.1 MG/D
1 FILM, EXTENDED RELEASE TRANSDERMAL 2 TIMES WEEKLY
Qty: 24 PATCH | Refills: 3 | Status: SHIPPED | OUTPATIENT
Start: 2024-12-19

## 2024-12-19 RX ORDER — FINASTERIDE 5 MG/1
5 TABLET, FILM COATED ORAL DAILY
Qty: 90 TABLET | Refills: 3 | Status: SHIPPED | OUTPATIENT
Start: 2024-12-19

## 2024-12-19 ASSESSMENT — PAIN SCALES - GENERAL: PAINLEVEL_OUTOF10: 0-NO PAIN

## 2025-01-03 DIAGNOSIS — Z00.6 RESEARCH STUDY PATIENT: Primary | ICD-10-CM

## 2025-01-06 ENCOUNTER — HOSPITAL ENCOUNTER (OUTPATIENT)
Dept: RADIOLOGY | Facility: HOSPITAL | Age: 75
Discharge: HOME | End: 2025-01-06
Payer: COMMERCIAL

## 2025-01-06 DIAGNOSIS — Z12.31 SCREENING MAMMOGRAM FOR BREAST CANCER: ICD-10-CM

## 2025-01-06 PROCEDURE — 77067 SCR MAMMO BI INCL CAD: CPT

## 2025-01-10 ENCOUNTER — APPOINTMENT (OUTPATIENT)
Dept: LAB | Facility: LAB | Age: 75
End: 2025-01-10
Payer: COMMERCIAL

## 2025-01-10 ENCOUNTER — LAB (OUTPATIENT)
Dept: LAB | Facility: LAB | Age: 75
End: 2025-01-10
Payer: COMMERCIAL

## 2025-01-10 DIAGNOSIS — E78.00 PURE HYPERCHOLESTEROLEMIA: ICD-10-CM

## 2025-01-10 DIAGNOSIS — Z00.6 RESEARCH STUDY PATIENT: ICD-10-CM

## 2025-01-10 LAB
BASOPHILS # BLD AUTO: 0.06 X10*3/UL (ref 0–0.1)
BASOPHILS NFR BLD AUTO: 0.7 %
EOSINOPHIL # BLD AUTO: 0.05 X10*3/UL (ref 0–0.4)
EOSINOPHIL NFR BLD AUTO: 0.6 %
ERYTHROCYTE [DISTWIDTH] IN BLOOD BY AUTOMATED COUNT: 17 % (ref 11.5–14.5)
EST. AVERAGE GLUCOSE BLD GHB EST-MCNC: 120 MG/DL
HBA1C MFR BLD: 5.8 %
HCT VFR BLD AUTO: 34.6 % (ref 36–46)
HGB BLD-MCNC: 10.7 G/DL (ref 12–16)
IMM GRANULOCYTES # BLD AUTO: 0.02 X10*3/UL (ref 0–0.5)
IMM GRANULOCYTES NFR BLD AUTO: 0.2 % (ref 0–0.9)
INR PPP: 1 (ref 0.9–1.1)
LYMPHOCYTES # BLD AUTO: 2.57 X10*3/UL (ref 0.8–3)
LYMPHOCYTES NFR BLD AUTO: 31.7 %
MCH RBC QN AUTO: 26 PG (ref 26–34)
MCHC RBC AUTO-ENTMCNC: 30.9 G/DL (ref 32–36)
MCV RBC AUTO: 84 FL (ref 80–100)
MONOCYTES # BLD AUTO: 0.52 X10*3/UL (ref 0.05–0.8)
MONOCYTES NFR BLD AUTO: 6.4 %
NEUTROPHILS # BLD AUTO: 4.88 X10*3/UL (ref 1.6–5.5)
NEUTROPHILS NFR BLD AUTO: 60.4 %
NRBC BLD-RTO: 0 /100 WBCS (ref 0–0)
PLATELET # BLD AUTO: 323 X10*3/UL (ref 150–450)
PROTHROMBIN TIME: 11.5 SECONDS (ref 9.8–12.8)
RBC # BLD AUTO: 4.12 X10*6/UL (ref 4–5.2)
WBC # BLD AUTO: 8.1 X10*3/UL (ref 4.4–11.3)

## 2025-01-10 PROCEDURE — 83036 HEMOGLOBIN GLYCOSYLATED A1C: CPT

## 2025-01-10 PROCEDURE — 85610 PROTHROMBIN TIME: CPT

## 2025-01-10 PROCEDURE — 85025 COMPLETE CBC W/AUTO DIFF WBC: CPT

## 2025-01-10 RX ORDER — ATORVASTATIN CALCIUM 40 MG/1
40 TABLET, FILM COATED ORAL DAILY
Qty: 90 TABLET | Refills: 2 | Status: SHIPPED | OUTPATIENT
Start: 2025-01-10

## 2025-01-14 DIAGNOSIS — B20 HIV DISEASE (MULTI): Primary | ICD-10-CM

## 2025-01-28 ENCOUNTER — CLINICAL SUPPORT (OUTPATIENT)
Dept: IMMUNOLOGY | Facility: CLINIC | Age: 75
End: 2025-01-28
Payer: COMMERCIAL

## 2025-01-28 DIAGNOSIS — B20 HUMAN IMMUNODEFICIENCY VIRUS (MULTI): ICD-10-CM

## 2025-01-28 PROCEDURE — 2500000004 HC RX 250 GENERAL PHARMACY W/ HCPCS (ALT 636 FOR OP/ED): Mod: JZ | Performed by: INTERNAL MEDICINE

## 2025-01-28 PROCEDURE — 96372 THER/PROPH/DIAG INJ SC/IM: CPT | Performed by: INTERNAL MEDICINE

## 2025-01-28 RX ADMIN — CABOTEGRAVIR AND RILPIVIRINE 6 ML: KIT at 09:54

## 2025-01-28 NOTE — PROGRESS NOTES
Patient here for cabenuva injections.  Meds delivered by local pharmacy.  Rilpivirine administered to right gluteal muscle.  Cabotegravir administered to left gluteal muscle.  Patient tolerated injections.

## 2025-02-11 ENCOUNTER — DOCUMENTATION (OUTPATIENT)
Dept: IMMUNOLOGY | Facility: CLINIC | Age: 75
End: 2025-02-11
Payer: COMMERCIAL

## 2025-02-11 NOTE — PROGRESS NOTES
Dr. Caballero closed April 11 clinic.  Patient rescheduled to May 16, 2025 at 10:30AM.  RN made patient aware of new time and date of her appointment.

## 2025-02-13 ENCOUNTER — APPOINTMENT (OUTPATIENT)
Dept: OPHTHALMOLOGY | Facility: CLINIC | Age: 75
End: 2025-02-13
Payer: COMMERCIAL

## 2025-02-17 DIAGNOSIS — Z79.899 HIGH RISK MEDICATION USE: ICD-10-CM

## 2025-02-17 DIAGNOSIS — E78.5 HYPERLIPIDEMIA, UNSPECIFIED HYPERLIPIDEMIA TYPE: ICD-10-CM

## 2025-02-17 DIAGNOSIS — Z21 HIV INFECTION, UNSPECIFIED SYMPTOM STATUS: ICD-10-CM

## 2025-02-17 DIAGNOSIS — Z11.3 ROUTINE SCREENING FOR STI (SEXUALLY TRANSMITTED INFECTION): ICD-10-CM

## 2025-02-28 ENCOUNTER — TELEPHONE (OUTPATIENT)
Dept: IMMUNOLOGY | Facility: CLINIC | Age: 75
End: 2025-02-28
Payer: COMMERCIAL

## 2025-02-28 NOTE — TELEPHONE ENCOUNTER
CHW called to remind pt about upcoming support group.     Pt may be able to attend.    CHW will follow up as needed.

## 2025-03-20 DIAGNOSIS — B20 HIV DISEASE (MULTI): Primary | ICD-10-CM

## 2025-03-25 ENCOUNTER — CLINICAL SUPPORT (OUTPATIENT)
Dept: IMMUNOLOGY | Facility: CLINIC | Age: 75
End: 2025-03-25
Payer: COMMERCIAL

## 2025-03-25 DIAGNOSIS — B20 ACQUIRED IMMUNODEFICIENCY SYNDROME (MULTI): ICD-10-CM

## 2025-03-25 PROCEDURE — 2500000004 HC RX 250 GENERAL PHARMACY W/ HCPCS (ALT 636 FOR OP/ED): Mod: JZ | Performed by: INTERNAL MEDICINE

## 2025-03-25 PROCEDURE — 96372 THER/PROPH/DIAG INJ SC/IM: CPT | Performed by: INTERNAL MEDICINE

## 2025-03-25 RX ADMIN — CABOTEGRAVIR AND RILPIVIRINE 6 ML: KIT at 14:55

## 2025-03-25 NOTE — PROGRESS NOTES
Pt given 3 mls of cabotegravir into left ventrogluteal and 3 mls of rilpivirine into right ventrogluteal. Both were well tolerated and pt was given the date for her next injection.

## 2025-04-10 ENCOUNTER — APPOINTMENT (OUTPATIENT)
Dept: OPHTHALMOLOGY | Facility: CLINIC | Age: 75
End: 2025-04-10
Payer: COMMERCIAL

## 2025-04-10 DIAGNOSIS — H40.1131 PRIMARY OPEN ANGLE GLAUCOMA (POAG) OF BOTH EYES, MILD STAGE: Primary | ICD-10-CM

## 2025-04-10 ASSESSMENT — CONF VISUAL FIELD
OD_SUPERIOR_NASAL_RESTRICTION: 0
OS_SUPERIOR_NASAL_RESTRICTION: 0
OD_SUPERIOR_TEMPORAL_RESTRICTION: 0
OS_SUPERIOR_TEMPORAL_RESTRICTION: 0
OS_INFERIOR_TEMPORAL_RESTRICTION: 0
OS_NORMAL: 1
OD_INFERIOR_NASAL_RESTRICTION: 0
OD_INFERIOR_TEMPORAL_RESTRICTION: 0
OD_NORMAL: 1
OS_INFERIOR_NASAL_RESTRICTION: 0

## 2025-04-10 ASSESSMENT — TONOMETRY
IOP_METHOD: GOLDMANN APPLANATION
OS_IOP_MMHG: 12
OD_IOP_MMHG: 14

## 2025-04-10 ASSESSMENT — ENCOUNTER SYMPTOMS
CARDIOVASCULAR NEGATIVE: 0
PSYCHIATRIC NEGATIVE: 0
RESPIRATORY NEGATIVE: 0
ALLERGIC/IMMUNOLOGIC NEGATIVE: 0
CONSTITUTIONAL NEGATIVE: 0
HEMATOLOGIC/LYMPHATIC NEGATIVE: 0
GASTROINTESTINAL NEGATIVE: 0
MUSCULOSKELETAL NEGATIVE: 0
NEUROLOGICAL NEGATIVE: 0
EYES NEGATIVE: 1
ENDOCRINE NEGATIVE: 0

## 2025-04-10 ASSESSMENT — EXTERNAL EXAM - RIGHT EYE: OD_EXAM: NORMAL

## 2025-04-10 ASSESSMENT — VISUAL ACUITY
OS_SC: 20/30
OD_SC: 20/30
METHOD: SNELLEN - LINEAR

## 2025-04-10 ASSESSMENT — EXTERNAL EXAM - LEFT EYE: OS_EXAM: NORMAL

## 2025-04-10 ASSESSMENT — SLIT LAMP EXAM - LIDS
COMMENTS: NORMAL
COMMENTS: NORMAL

## 2025-04-10 ASSESSMENT — PACHYMETRY
OS_CT(UM): 570
OD_CT(UM): 570

## 2025-04-10 ASSESSMENT — CUP TO DISC RATIO
OS_RATIO: 0.7
OD_RATIO: 0.7

## 2025-04-10 NOTE — PROGRESS NOTES
History    Chief Complaint    Glaucoma       HPI       Glaucoma    In both eyes.             Comments    75 Year old female presents for glaucoma f/u. She c/o watery eyes when she is outside. She denies any itching. Pt is not taking any drops. Pt denies any changes in vision.           Last edited by ANDRE Carrillo on 4/10/2025  3:20 PM.        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)    Inflammatory liver disease    Hyperlipidemia    HIV disease (CMS/HCC)    Hepatitis C, chronic (CMS/HCC)    Goiter    GERD (gastroesophageal reflux disease)    Genital HSV    Gender dysphoria    Gender dysphoria in adult    Epidermal cyst    Combined form of age-related cataract, both eyes    Central obesity    Bilateral presbyopia    Acquired immunodeficiency syndrome (CMS/HCC)       Past Medical History:   Diagnosis Date    Combined forms of age-related cataract, left eye 10/25/2018    Combined form of age-related cataract, left eye    Combined forms of age-related cataract, right eye 10/25/2018    Combined form of age-related cataract, right eye    Cortical age-related cataract, unspecified eye 06/28/2016    Cataract cortical, senile    Disorder of ligament, unspecified wrist     Disorder of ligament of wrist    Encounter for screening for malignant neoplasm of colon 08/13/2018    Colon cancer screening    Furuncle of back (any part, except buttock) 11/01/2018    Boil, back    Furuncle, unspecified     Boil    Furuncle, unspecified 08/12/2022    Boil    Human immunodeficiency virus (HIV) disease (Multi) 01/16/2018    HIV  infection    Human immunodeficiency virus (HIV) disease (Multi) 01/16/2018    HIV disease    Hyperlipidemia     Hypermetropia, bilateral 10/24/2022    Hyperopia of both eyes with astigmatism and presbyopia    Hypermetropia, unspecified eye 09/18/2018    Hyperopia with astigmatism and presbyopia    Melena 02/16/2015    Blood in stool    Nicotine dependence, cigarettes, uncomplicated 02/04/2019    Cigarette nicotine dependence without complication    Other abnormal and inconclusive findings on diagnostic imaging of breast 10/21/2015    Abnormal finding on mammography    Other conditions influencing health status     Epicondylitis    Other conditions influencing health status 06/25/2021    History of cough    Pain in left shoulder 10/25/2016    Left shoulder pain    Pain in left shoulder 11/21/2016    Left shoulder pain, unspecified chronicity    Pain in right shoulder 11/21/2016    Right shoulder pain    Person consulting for explanation of examination or test findings     Visit for review of DEXA scan    Personal history of infections of the central nervous system     History of meningitis    Personal history of nicotine dependence 08/13/2018    History of nicotine dependence    Personal history of other diseases of the digestive system 04/28/2014    History of constipation    Personal history of other diseases of the musculoskeletal system and connective tissue 05/30/2017    History of low back pain    Personal history of other infectious and parasitic diseases     History of syphilis    Personal history of other infectious and parasitic diseases 04/28/2014    History of herpes simplex infection    Personal history of other specified conditions     History of vomiting    Personal history of other specified conditions     History of abnormal weight loss    Preglaucoma, unspecified, bilateral 01/16/2018    Glaucoma suspect of both eyes    Preglaucoma, unspecified, unspecified eye 05/30/2017    Glaucoma suspect     Preglaucoma, unspecified, unspecified eye     Glaucoma suspect    Primary open-angle glaucoma, bilateral, moderate stage 01/16/2018    Primary open angle glaucoma of both eyes, moderate stage    Rash and other nonspecific skin eruption     Skin rash    Rash and other nonspecific skin eruption 08/24/2015    Rash    Rash and other nonspecific skin eruption 04/28/2014    Rash     Past Surgical History:   Procedure Laterality Date    COLONOSCOPY  04/28/2014    Colonoscopy (Fiberoptic)    OTHER SURGICAL HISTORY  01/27/2014    Biopsy Of Liver    OTHER SURGICAL HISTORY  01/27/2014    Laryngoscopy (Diagnostic)     SOCIAL HISTORY   SMOKING:  reports that she has been smoking cigarettes. She has never used smokeless tobacco.  DRUG USE:    reports current drug use. Drug: Marijuana.    FAMILY HISTORY  family history includes Alzheimer's disease in her mother; Breast cancer in her mother; Cancer in her mother; Cataracts in her mother; Diabetes in her brother and mother.    CURRENT MEDICATIONS  No current outpatient medications on file. (Ophthalmology pharm classes)       Current Outpatient Medications (Other)   Medication Sig Dispense Refill    acyclovir (Zovirax) 200 mg capsule Take 1 capsule (200 mg) by mouth 2 times a day.      aspirin 81 mg EC tablet Take 1 tablet (81 mg) by mouth once daily.      atorvastatin (Lipitor) 40 mg tablet Take 1 tablet (40 mg) by mouth once daily. 90 tablet 2    cabotegravir-rilpivirine (Cabenuva) 600 mg/3 mL- 900 mg/3 mL suspension,extended release Inject 6 mL into the muscle see administration instructions. Administer 3 mL of cabotegravir and 3 mL of rilpivirine intramuscularly every 2 months. 6 mL 3    Climara 0.1 mg/24 hr patch Place 1 patch over 7 days on the skin 1 (one) time per week. (Patient not taking: Reported on 9/20/2024) 12 patch 3    docusate sodium (Colace) 100 mg capsule Take 1 capsule (100 mg) by mouth 2 times a day. 60 capsule 5    estradiol (Vivelle-DOT) 0.1 mg/24 hr patch  "Place 1 patch on the skin 2 times a week. AS DIRECTED 24 patch 3    finasteride (Proscar) 5 mg tablet Take 1 tablet (5 mg) by mouth once daily. 90 tablet 3    magnesium hydroxide (Milk of Magnesia) 400 mg/5 mL suspension Take 30 mL by mouth once daily as needed for constipation. (Patient not taking: Reported on 9/20/2024) 360 mL 0    MAGNESIUM OXIDE ORAL Take 400 mg by mouth twice a day.      RANITIDINE HCL ORAL Take 300 mg by mouth once daily.         Exam   Visual Acuity (Snellen - Linear)         Right Left    Dist sc 20/30 20/30              Edited by: Yojana Rodriguez, COT              Not recorded       Not recorded       Pupils       Pupils         Pupils    Right PERRL, No APD    Left PERRL, No APD                  Intraocular pressure was 14 in the right eye and 12 in the left eye using Goldmann Applanation.  Extraocular Movement       Extraocular Movement         Right Left     Full Full                  Not recorded       Not recorded        External Exam         Right Left    External Normal Normal              Slit Lamp Exam         Right Left    Lids/Lashes Normal Normal    Conjunctiva/Sclera White and quiet White and quiet    Cornea Clear Clear    Anterior Chamber Deep and quiet Deep and quiet    Iris Round and reactive Round and reactive    Lens PCIOL well centered in the bag PCIOL well centered in the bag    Anterior Vitreous Normal Normal              Fundus Exam         Right Left    Macula  flat                   <div id=\"MAIN_EXAM_REVIEWED\"></div>       Diagnostics  Mckinnon Visual Field - OU - Both Eyes          Normal without evidence of glaucoma           OCT, Optic Nerve - OU - Both Eyes          Thinning OU compared to baseline but artifactual from scan                 Plan   -  The encounter diagnosis was Primary open angle glaucoma (POAG) of both eyes, mild stage.  -  Referred By:  Radha Coombs MD  -  IOP:  Last Tonometry OD 14 / OS 12 /Date 3:24 PM      Target OD: No Value exists for " the : EPIC#[id number] Target OS: No Value exists for the : EPIC#RFH373       Max pressure OD:   Date:         Max Pressure OS:   Date:    -    Not recorded         -    Not recorded       -  Pathophysiology of glaucoma, and potential blinding nature of disease reviewed.      Importance of follow up and compliance stressed  Overall patient is low risk  S/p phaco abic OU, patient now off drops  IOP stable, testing stable  Rtc 6 months for IOP check

## 2025-04-11 ENCOUNTER — APPOINTMENT (OUTPATIENT)
Dept: IMMUNOLOGY | Facility: CLINIC | Age: 75
End: 2025-04-11
Payer: COMMERCIAL

## 2025-04-28 ENCOUNTER — CLINICAL SUPPORT (OUTPATIENT)
Dept: IMMUNOLOGY | Facility: CLINIC | Age: 75
End: 2025-04-28
Payer: COMMERCIAL

## 2025-04-28 DIAGNOSIS — Z79.899 HIGH RISK MEDICATION USE: ICD-10-CM

## 2025-04-28 DIAGNOSIS — Z11.3 ROUTINE SCREENING FOR STI (SEXUALLY TRANSMITTED INFECTION): ICD-10-CM

## 2025-04-28 DIAGNOSIS — Z21 HIV INFECTION, UNSPECIFIED SYMPTOM STATUS: ICD-10-CM

## 2025-04-28 DIAGNOSIS — E78.5 HYPERLIPIDEMIA, UNSPECIFIED HYPERLIPIDEMIA TYPE: ICD-10-CM

## 2025-04-28 LAB
ALBUMIN SERPL BCP-MCNC: 4.2 G/DL (ref 3.4–5)
ALP SERPL-CCNC: 62 U/L (ref 33–136)
ALT SERPL W P-5'-P-CCNC: 11 U/L (ref 7–45)
ANION GAP SERPL CALC-SCNC: 14 MMOL/L (ref 10–20)
AST SERPL W P-5'-P-CCNC: 19 U/L (ref 9–39)
BASOPHILS # BLD AUTO: 0.05 X10*3/UL (ref 0–0.1)
BASOPHILS # BLD AUTO: 0.06 X10*3/UL (ref 0–0.1)
BASOPHILS NFR BLD AUTO: 0.7 %
BASOPHILS NFR BLD AUTO: 0.8 %
BILIRUB SERPL-MCNC: 0.4 MG/DL (ref 0–1.2)
BUN SERPL-MCNC: 13 MG/DL (ref 6–23)
CALCIUM SERPL-MCNC: 9.6 MG/DL (ref 8.6–10.6)
CHLORIDE SERPL-SCNC: 98 MMOL/L (ref 98–107)
CHOLEST SERPL-MCNC: 197 MG/DL (ref 0–199)
CHOLESTEROL/HDL RATIO: 2.4
CO2 SERPL-SCNC: 26 MMOL/L (ref 21–32)
CREAT SERPL-MCNC: 1.02 MG/DL (ref 0.5–1.05)
EGFRCR SERPLBLD CKD-EPI 2021: 58 ML/MIN/1.73M*2
EOSINOPHIL # BLD AUTO: 0.06 X10*3/UL (ref 0–0.4)
EOSINOPHIL # BLD AUTO: 0.06 X10*3/UL (ref 0–0.4)
EOSINOPHIL NFR BLD AUTO: 0.8 %
EOSINOPHIL NFR BLD AUTO: 0.8 %
ERYTHROCYTE [DISTWIDTH] IN BLOOD BY AUTOMATED COUNT: 17.3 % (ref 11.5–14.5)
ERYTHROCYTE [DISTWIDTH] IN BLOOD BY AUTOMATED COUNT: 17.3 % (ref 11.5–14.5)
GLUCOSE SERPL-MCNC: 92 MG/DL (ref 74–99)
HCT VFR BLD AUTO: 33.3 % (ref 36–46)
HCT VFR BLD AUTO: 33.8 % (ref 36–46)
HDLC SERPL-MCNC: 81.9 MG/DL
HGB BLD-MCNC: 10.4 G/DL (ref 12–16)
HGB BLD-MCNC: 10.5 G/DL (ref 12–16)
IMM GRANULOCYTES # BLD AUTO: 0.02 X10*3/UL (ref 0–0.5)
IMM GRANULOCYTES # BLD AUTO: 0.02 X10*3/UL (ref 0–0.5)
IMM GRANULOCYTES NFR BLD AUTO: 0.3 % (ref 0–0.9)
IMM GRANULOCYTES NFR BLD AUTO: 0.3 % (ref 0–0.9)
LDLC SERPL CALC-MCNC: 105 MG/DL
LYMPHOCYTES # BLD AUTO: 2.78 X10*3/UL (ref 0.8–3)
LYMPHOCYTES # BLD AUTO: 2.92 X10*3/UL (ref 0.8–3)
LYMPHOCYTES NFR BLD AUTO: 37.7 %
LYMPHOCYTES NFR BLD AUTO: 38.9 %
MCH RBC QN AUTO: 26.2 PG (ref 26–34)
MCH RBC QN AUTO: 26.9 PG (ref 26–34)
MCHC RBC AUTO-ENTMCNC: 30.8 G/DL (ref 32–36)
MCHC RBC AUTO-ENTMCNC: 31.5 G/DL (ref 32–36)
MCV RBC AUTO: 85 FL (ref 80–100)
MCV RBC AUTO: 85 FL (ref 80–100)
MONOCYTES # BLD AUTO: 0.53 X10*3/UL (ref 0.05–0.8)
MONOCYTES # BLD AUTO: 0.54 X10*3/UL (ref 0.05–0.8)
MONOCYTES NFR BLD AUTO: 7.1 %
MONOCYTES NFR BLD AUTO: 7.3 %
NEUTROPHILS # BLD AUTO: 3.91 X10*3/UL (ref 1.6–5.5)
NEUTROPHILS # BLD AUTO: 3.93 X10*3/UL (ref 1.6–5.5)
NEUTROPHILS NFR BLD AUTO: 52.1 %
NEUTROPHILS NFR BLD AUTO: 53.2 %
NON HDL CHOLESTEROL: 115 MG/DL (ref 0–149)
NRBC BLD-RTO: 0 /100 WBCS (ref 0–0)
NRBC BLD-RTO: 0 /100 WBCS (ref 0–0)
PLATELET # BLD AUTO: 351 X10*3/UL (ref 150–450)
PLATELET # BLD AUTO: 353 X10*3/UL (ref 150–450)
POTASSIUM SERPL-SCNC: 4 MMOL/L (ref 3.5–5.3)
PROT SERPL-MCNC: 7.3 G/DL (ref 6.4–8.2)
RBC # BLD AUTO: 3.91 X10*6/UL (ref 4–5.2)
RBC # BLD AUTO: 3.97 X10*6/UL (ref 4–5.2)
SODIUM SERPL-SCNC: 134 MMOL/L (ref 136–145)
TREPONEMA PALLIDUM IGG+IGM AB [PRESENCE] IN SERUM OR PLASMA BY IMMUNOASSAY: REACTIVE
TRIGL SERPL-MCNC: 52 MG/DL (ref 0–149)
VLDL: 10 MG/DL (ref 0–40)
WBC # BLD AUTO: 7.4 X10*3/UL (ref 4.4–11.3)
WBC # BLD AUTO: 7.5 X10*3/UL (ref 4.4–11.3)

## 2025-04-28 PROCEDURE — 87536 HIV-1 QUANT&REVRSE TRNSCRPJ: CPT

## 2025-04-28 PROCEDURE — 80061 LIPID PANEL: CPT

## 2025-04-28 PROCEDURE — 36415 COLL VENOUS BLD VENIPUNCTURE: CPT

## 2025-04-28 PROCEDURE — 88185 FLOWCYTOMETRY/TC ADD-ON: CPT

## 2025-04-28 PROCEDURE — 86780 TREPONEMA PALLIDUM: CPT

## 2025-04-28 PROCEDURE — 87491 CHLMYD TRACH DNA AMP PROBE: CPT

## 2025-04-28 PROCEDURE — 86318 IA INFECTIOUS AGENT ANTIBODY: CPT

## 2025-04-28 PROCEDURE — 85025 COMPLETE CBC W/AUTO DIFF WBC: CPT

## 2025-04-28 PROCEDURE — 80053 COMPREHEN METABOLIC PANEL: CPT

## 2025-04-29 LAB
C TRACH RRNA SPEC QL NAA+PROBE: NEGATIVE
CD3+CD4+ CELLS # BLD: 1.37 X10E9/L (ref 0.35–2.74)
CD3+CD4+ CELLS # BLD: 1372 /MM3 (ref 350–2740)
CD3+CD4+ CELLS NFR BLD: 47 % (ref 29–57)
CD3+CD4+ CELLS/CD3+CD8+ CLL BLD: 1.68 % (ref 1–3.5)
CD3+CD8+ CELLS # BLD: 0.82 X10E9/L (ref 0.08–1.49)
CD3+CD8+ CELLS NFR BLD: 28 % (ref 7–31)
HIV1 RNA # PLAS NAA DL=20: NOT DETECTED {COPIES}/ML
HIV1 RNA SPEC NAA+PROBE-LOG#: NORMAL {LOG_COPIES}/ML
LYMPHOCYTES # SPEC AUTO: 2.92 X10*3/UL
N GONORRHOEA DNA SPEC QL PROBE+SIG AMP: NEGATIVE
RPR SER QL: NONREACTIVE

## 2025-04-30 LAB — T PALLIDUM AB SER QL AGGL: REACTIVE

## 2025-05-08 ENCOUNTER — DOCUMENTATION (OUTPATIENT)
Dept: IMMUNOLOGY | Facility: CLINIC | Age: 75
End: 2025-05-08
Payer: COMMERCIAL

## 2025-05-08 NOTE — PROGRESS NOTES
Pt met with RN today, and pt's self-scheduled ride left without picking pt up. SW scheduled  a [employer] through [employer].  Qualification: physically unable to take public transportation

## 2025-05-09 DIAGNOSIS — B20 HIV DISEASE (MULTI): Primary | ICD-10-CM

## 2025-05-16 ENCOUNTER — OFFICE VISIT (OUTPATIENT)
Dept: IMMUNOLOGY | Facility: CLINIC | Age: 75
End: 2025-05-16
Payer: COMMERCIAL

## 2025-05-16 VITALS
DIASTOLIC BLOOD PRESSURE: 67 MMHG | WEIGHT: 140.6 LBS | HEART RATE: 96 BPM | OXYGEN SATURATION: 96 % | BODY MASS INDEX: 23.4 KG/M2 | SYSTOLIC BLOOD PRESSURE: 115 MMHG

## 2025-05-16 DIAGNOSIS — E78.5 HYPERLIPIDEMIA, UNSPECIFIED HYPERLIPIDEMIA TYPE: ICD-10-CM

## 2025-05-16 DIAGNOSIS — Z21 HIV INFECTION, UNSPECIFIED SYMPTOM STATUS: Primary | ICD-10-CM

## 2025-05-16 PROCEDURE — 96372 THER/PROPH/DIAG INJ SC/IM: CPT | Performed by: INTERNAL MEDICINE

## 2025-05-16 PROCEDURE — 2500000004 HC RX 250 GENERAL PHARMACY W/ HCPCS (ALT 636 FOR OP/ED): Mod: JZ | Performed by: INTERNAL MEDICINE

## 2025-05-16 PROCEDURE — 99214 OFFICE O/P EST MOD 30 MIN: CPT | Performed by: INTERNAL MEDICINE

## 2025-05-16 RX ADMIN — CABOTEGRAVIR AND RILPIVIRINE 6 ML: KIT at 11:53

## 2025-05-16 ASSESSMENT — PAIN SCALES - GENERAL: PAINLEVEL_OUTOF10: 0-NO PAIN

## 2025-05-16 NOTE — PROGRESS NOTES
HIV Clinic Follow-up Visit:    Mishel Wagner  is a 75 y.o. old female with HIV. On Cabenuva with no missed doses or intolerance. Here for follow up, was last seen in [facility] 9/2024.    Missed antiretroviral doses in last 72 hours? No    Tobacco use: No     SUBJECTIVE:     Most recent labs:  4/2025  CBC, CMP  wnl   HBa1c wnl  Lipid panel wnl  CG, syphilis, TV negative  HIV not detected, CD4 wnl      - No complains this visit.    - Hyperlipidemia: on statin 40mg/D    - HTN: wnl    - Vaccination: UTD    - Social History:  Doesn't work, lives at home alone,  doesn't smoke, does drink alcohol occasionally.  Describes mood as fine, no suicidal ideations.     ROS:  General: no fever; normal activity; normal sleep; good PO intake  HEENT: no eye drainage or redness; no ear drainage or pain; no rhinorrhea, congestion, sneezing; no sore throat  CV: no chest pain, murmur, or palpitations  Resp: no shortness of breath, cough, or wheezing  GI: no abdominal pain, nausea, vomiting, diarrhea, or constipation  : no dysuria  MSK: no arthralgias or swelling  Derm: no rashes  Neuro: no headaches; no weakness  Psych: normal behavior       CURRENT MEDICATIONS:  Current Medications[1]    PHYSICAL EXAMINATION:  Visit Vitals  /67 (BP Location: Left arm, Patient Position: Sitting)   Pulse 96   Wt 63.8 kg (140 lb 9.6 oz)   SpO2 96%   BMI 23.40 kg/m²   OB Status Born without uterus   Smoking Status Some Days   BSA 1.71 m²     Physical Exam:  General: Well-appearing, no acute distress  HEENT: Mucous membranes are moist.  No conjunctival injection.  CV: Regular rate and rhythm, no murmurs, rubs, or gallops  Lungs: symmetric chest expansion, CTAB, no increased WOB, no wheezes/rhonchi  Abdomen: Soft, nontender, nondistended  Extremities: Warm and well perfused.  No edema  Skin: No rash or ulcers  Neurological: alert, interactive  Lymphadenopathy: No cervical lymphadenopathy      PERTINENT DATA:  CBC:  WBC   Date Value Ref Range Status    04/28/2025 7.4 4.4 - 11.3 x10*3/uL Final   04/28/2025 7.5 4.4 - 11.3 x10*3/uL Final     nRBC   Date Value Ref Range Status   04/28/2025 0.0 0.0 - 0.0 /100 WBCs Final   04/28/2025 0.0 0.0 - 0.0 /100 WBCs Final     RBC   Date Value Ref Range Status   04/28/2025 3.91 (L) 4.00 - 5.20 x10*6/uL Final   04/28/2025 3.97 (L) 4.00 - 5.20 x10*6/uL Final     Hemoglobin   Date Value Ref Range Status   04/28/2025 10.5 (L) 12.0 - 16.0 g/dL Final   04/28/2025 10.4 (L) 12.0 - 16.0 g/dL Final     MCV   Date Value Ref Range Status   04/28/2025 85 80 - 100 fL Final   04/28/2025 85 80 - 100 fL Final     RDW   Date Value Ref Range Status   04/28/2025 17.3 (H) 11.5 - 14.5 % Final   04/28/2025 17.3 (H) 11.5 - 14.5 % Final       Renal Function Panel:  Glucose   Date Value Ref Range Status   04/28/2025 92 74 - 99 mg/dL Final     Sodium   Date Value Ref Range Status   04/28/2025 134 (L) 136 - 145 mmol/L Final     Potassium   Date Value Ref Range Status   04/28/2025 4.0 3.5 - 5.3 mmol/L Final     Chloride   Date Value Ref Range Status   04/28/2025 98 98 - 107 mmol/L Final     Anion Gap   Date Value Ref Range Status   04/28/2025 14 10 - 20 mmol/L Final     Urea Nitrogen   Date Value Ref Range Status   04/28/2025 13 6 - 23 mg/dL Final     Creatinine   Date Value Ref Range Status   04/28/2025 1.02 0.50 - 1.05 mg/dL Final     eGFR   Date Value Ref Range Status   04/28/2025 58 (L) >60 mL/min/1.73m*2 Final     Comment:     Calculations of estimated GFR are performed using the 2021 CKD-EPI Study Refit equation without the race variable for the IDMS-Traceable creatinine methods.  https://jasn.asnjournals.org/content/early/2021/09/22/ASN.4273370638     Calcium   Date Value Ref Range Status   04/28/2025 9.6 8.6 - 10.6 mg/dL Final     Albumin   Date Value Ref Range Status   04/28/2025 4.2 3.4 - 5.0 g/dL Final       Hepatic Panel:  Albumin   Date Value Ref Range Status   04/28/2025 4.2 3.4 - 5.0 g/dL Final     Bilirubin, Total   Date Value Ref Range  Status   04/28/2025 0.4 0.0 - 1.2 mg/dL Final     Bilirubin, Direct   Date Value Ref Range Status   04/19/2022 0.1 0.0 - 0.3 mg/dL Final     Alkaline Phosphatase   Date Value Ref Range Status   04/28/2025 62 33 - 136 U/L Final     ALT   Date Value Ref Range Status   04/28/2025 11 7 - 45 U/L Final     Comment:     Patients treated with Sulfasalazine may generate falsely decreased results for ALT.       HIV Viral Load:  HIV-1 RNA PCR Viral Load Log   Date Value Ref Range Status   04/28/2025   Final     Comment:     Not calculated     HIV RNA Result   Date Value Ref Range Status   04/28/2025 Not Detected Not Detected Final       CD4 Count:  CD3+CD4+%   Date Value Ref Range Status   04/28/2025 47 29 - 57 % Final     CD3+CD4+ Absolute   Date Value Ref Range Status   04/28/2025 1.372 0.350 - 2.740 x10E9/L Final     CD3+CD8+%   Date Value Ref Range Status   04/28/2025 28 7 - 31 % Final     CD3+CD8+ Absolute   Date Value Ref Range Status   04/28/2025 0.818 0.080 - 1.490 x10E9/L Final     CD4/CD8 Ratio   Date Value Ref Range Status   04/28/2025 1.68 1.00 - 2.60 Final     CD45%   Date Value Ref Range Status   09/06/2023 100 % Final       CRCL:  Creatinine, Urine   Date Value Ref Range Status   08/02/2022 28.5 20.0 - 320.0 mg/dL Final       Lipid Panel:  HDL-Cholesterol   Date Value Ref Range Status   04/28/2025 81.9 mg/dL Final     Comment:       Age       Very Low   Low     Normal    High    0-19 Y    < 35      < 40     40-45     ----  20-24 Y    ----     < 40      >45      ----        >24 Y      ----     < 40     40-60      >60       Cholesterol/HDL Ratio   Date Value Ref Range Status   04/28/2025 2.4  Final     Comment:       Ref Values  Desirable  < 3.4  High Risk  > 5.0     LDL   Date Value Ref Range Status   09/06/2023 100 (H) 0 - 99 mg/dL Final     Comment:     .                           NEAR      BORD      AGE      DESIRABLE  OPTIMAL    HIGH     HIGH     VERY HIGH     0-19 Y     0 - 109     ---    110-129   >/= 130      ----    20-24 Y     0 - 119     ---    120-159   >/= 160     ----      >24 Y     0 -  99   100-129  130-159   160-189     >/=190  .       VLDL   Date Value Ref Range Status   04/28/2025 10 0 - 40 mg/dL Final     Triglycerides   Date Value Ref Range Status   04/28/2025 52 0 - 149 mg/dL Final     Comment:     Age              Desirable        Borderline         High        Very High  SEX:B           mg/dL             mg/dL               mg/dL      mg/dL  <=14D                       ----               ----        ----  15D-365D                    ----               ----        ----  1Y-9Y           0-74               75-99             >=100       ----  10Y-19Y        0-89                            >=130       ----  20Y-24Y        0-114             115-149             >=150      ----  >= 25Y         0-149             150-199             200-499    >=500      Venipuncture immediately after or during the administration of Metamizole may lead to falsely low results. Testing should be performed immediately prior to Metamizole dosing.       HgbA1c:  Hemoglobin A1C   Date Value Ref Range Status   01/10/2025 5.8 (H) See comment % Final       The 10-year ASCVD risk score (Sudeep DK, et al., 2019) is: 53.5%    Values used to calculate the score:      Age: 75 years      Sex: Female      Is Non- : Yes      Diabetic: Yes      Tobacco smoker: Yes      Systolic Blood Pressure: 115 mmHg      Is BP treated: No      HDL Cholesterol: 81.9 mg/dL      Total Cholesterol: 197 mg/dL    Assessment:  Mishel Wagner  is a 75 y.o. old female with HIV. On Cabenuva with no missed doses or intolerance. Here for follow up, was last seen in [facility] 9/2024.    Plan:  - Will take cabenuva today  - Follow up in [facility] clinic in 3-6 months or earlier if needed.      Patient seen and staffed with Attending Dr. Federico [facility], PGY6  Pediatric Infectious Disease Fellow  [facility]   ID  Pager: [phone]            [1]   Current Outpatient Medications:     aspirin 81 mg EC tablet, Take 1 tablet (81 mg) by mouth once daily., Disp: , Rfl:     atorvastatin (Lipitor) 40 mg tablet, Take 1 tablet (40 mg) by mouth once daily., Disp: 90 tablet, Rfl: 2    cabotegravir-rilpivirine (Cabenuva) 600 mg/3 mL- 900 mg/3 mL suspension,extended release, Inject 6 mL into the muscle see administration instructions. Administer 3 mL of cabotegravir and 3 mL of rilpivirine intramuscularly every 2 months., Disp: 6 mL, Rfl: 3    docusate sodium (Colace) 100 mg capsule, Take 1 capsule (100 mg) by mouth 2 times a day., Disp: 60 capsule, Rfl: 5    estradiol (Vivelle-DOT) 0.1 mg/24 hr patch, Place 1 patch on the skin 2 times a week. AS DIRECTED, Disp: 24 patch, Rfl: 3    finasteride (Proscar) 5 mg tablet, Take 1 tablet (5 mg) by mouth once daily., Disp: 90 tablet, Rfl: 3    MAGNESIUM OXIDE ORAL, Take 400 mg by mouth twice a day., Disp: , Rfl:     Current Facility-Administered Medications:     cabotegravir-rilpivirine (CABENUVA) 600 mg/3 mL- 900 mg/3 mL IM suspension 6 mL, 6 mL, intramuscular, Once, Quita Caballero MD

## 2025-07-09 DIAGNOSIS — B20 HIV DISEASE (MULTI): ICD-10-CM

## 2025-07-09 RX ORDER — CABOTEGRAVIR AND RILPIVIRINE 600-900/3
6 KIT INTRAMUSCULAR SEE ADMIN INSTRUCTIONS
Qty: 6 ML | Refills: 3 | Status: SHIPPED | OUTPATIENT
Start: 2025-07-09

## 2025-07-10 DIAGNOSIS — B20 HIV DISEASE (MULTI): Primary | ICD-10-CM

## 2025-07-13 DIAGNOSIS — F64.9 GENDER DYSPHORIA: ICD-10-CM

## 2025-07-15 ENCOUNTER — CLINICAL SUPPORT (OUTPATIENT)
Dept: IMMUNOLOGY | Facility: CLINIC | Age: 75
End: 2025-07-15
Payer: COMMERCIAL

## 2025-07-15 DIAGNOSIS — Z21 HIV INFECTION, UNSPECIFIED SYMPTOM STATUS: ICD-10-CM

## 2025-07-15 DIAGNOSIS — E78.5 HYPERLIPIDEMIA, UNSPECIFIED HYPERLIPIDEMIA TYPE: ICD-10-CM

## 2025-07-15 DIAGNOSIS — Z11.3 ROUTINE SCREENING FOR STI (SEXUALLY TRANSMITTED INFECTION): ICD-10-CM

## 2025-07-15 DIAGNOSIS — E66.9 OBESITY (BMI 30-39.9): ICD-10-CM

## 2025-07-15 DIAGNOSIS — Z79.899 HIGH RISK MEDICATION USE: ICD-10-CM

## 2025-07-15 DIAGNOSIS — E11.9 TYPE 2 DIABETES MELLITUS WITHOUT COMPLICATION, WITHOUT LONG-TERM CURRENT USE OF INSULIN: ICD-10-CM

## 2025-07-15 PROCEDURE — 96372 THER/PROPH/DIAG INJ SC/IM: CPT | Performed by: INTERNAL MEDICINE

## 2025-07-15 PROCEDURE — 2500000004 HC RX 250 GENERAL PHARMACY W/ HCPCS (ALT 636 FOR OP/ED): Mod: JZ | Performed by: INTERNAL MEDICINE

## 2025-07-15 RX ADMIN — CABOTEGRAVIR AND RILPIVIRINE 6 ML: KIT at 12:31

## 2025-07-15 NOTE — PROGRESS NOTES
Patient was here for cabenuva injections.  Meds delivered by patient's local pharmacy.  Rilpivirine administered to right gluteal muscle without incident.  Cabotegravir administered to left gluteal muscle without incident.  Patient tolerated injections.

## 2025-07-17 DIAGNOSIS — F64.9 GENDER DYSPHORIA: ICD-10-CM

## 2025-07-17 RX ORDER — FINASTERIDE 5 MG/1
5 TABLET, FILM COATED ORAL DAILY
Qty: 90 TABLET | Refills: 1 | Status: SHIPPED | OUTPATIENT
Start: 2025-07-17

## 2025-07-18 RX ORDER — FINASTERIDE 5 MG/1
5 TABLET, FILM COATED ORAL DAILY
Qty: 90 TABLET | Refills: 3 | Status: SHIPPED | OUTPATIENT
Start: 2025-07-18

## 2025-09-04 DIAGNOSIS — B20 HIV DISEASE (MULTI): Primary | ICD-10-CM

## 2025-10-09 ENCOUNTER — APPOINTMENT (OUTPATIENT)
Dept: OPHTHALMOLOGY | Facility: CLINIC | Age: 75
End: 2025-10-09
Payer: COMMERCIAL

## 2025-11-06 ENCOUNTER — APPOINTMENT (OUTPATIENT)
Dept: OPHTHALMOLOGY | Facility: CLINIC | Age: 75
End: 2025-11-06
Payer: COMMERCIAL

## 2025-11-14 ENCOUNTER — APPOINTMENT (OUTPATIENT)
Dept: IMMUNOLOGY | Facility: CLINIC | Age: 75
End: 2025-11-14
Payer: COMMERCIAL

## (undated) DEVICE — NEEDLE, HYPODERMIC, REGULAR WALL, REGULAR BEVEL, 30 G X 0.5 IN

## (undated) DEVICE — CANNULA, CHANG HYDRO- DISSECT, 27G, FLAT TIP, DISP

## (undated) DEVICE — SYRINGE, 1 CC, LUER LOCK

## (undated) DEVICE — COUNTER, NEEDLE, FOAM BLOCK, W/MAGNET, W/BLADE GUARD, 10 COUNT, RED, LF

## (undated) DEVICE — SPEAR, EYE, SURGICAL, WECK-CELL, CELLULOSE

## (undated) DEVICE — SUTURE, VICRYL, 7-0, 18 IN, TG1608, DA, VIOLET

## (undated) DEVICE — Device

## (undated) DEVICE — SYRINGE, INSULIN, LUER LOCK, 1ML

## (undated) DEVICE — CANNULA, 27G X 22MM, ANTERIOR, CHAMBER, RYCROFT

## (undated) DEVICE — SOLUTION, PREP, OPTHALMIC, BETADINE 5%, 30ML, STRL

## (undated) DEVICE — GLOVE, SURGICAL, PROTEXIS,  6.5, PF, LATEX

## (undated) DEVICE — ERASER, HEMOSTATIC, WET-FIELD, BIPOLAR, 18 G

## (undated) DEVICE — SOLUTION, BSS IRRIGATING 15ML

## (undated) DEVICE — SUTURE, VICRYL, 8-0, 12 IN, TG1406, DA, VIOLET

## (undated) DEVICE — PHACO PAK, CENTURION, 0.9 TIPLESS

## (undated) DEVICE — SOLUTION, OPHTHALMIC, TETRACAINE HCL 0.5%, 2 ML, VIAL

## (undated) DEVICE — KNIFE, SIDEPORT, DUAL BEVEL, ANGLED, 1.0MM

## (undated) DEVICE — HANDLE, MST, F/MST TIPS

## (undated) DEVICE — CORD, CAUTERY, BIPOLAR, STERILE

## (undated) DEVICE — CANNULA, ANTERIOR CHAMBER

## (undated) DEVICE — SYRINGE, 3 CC LUER LOCK, W/PRECISIONGLIDE NEEDLE, IV, REGULAR BEVEL, THIN WALL, 21 G X 1.5

## (undated) DEVICE — SOLUTION, IRRIGATION, STERILE WATER, 1000 ML, POUR BOTTLE

## (undated) DEVICE — CANNULA, VISTEC, ANTERIOR CHAMBER, RYCROFT, ANGLED, 30 G X 7/8 IN

## (undated) DEVICE — SOLUTION, BSS IRRIGATING 500ML BAG

## (undated) DEVICE — KNIFE, CLEARCUT SLIT, SIINGLE BEVEL, 2.4MM, ANG

## (undated) DEVICE — GLOVE, SURGICAL, PROTEXIS PI , 6.5, PF, LF

## (undated) DEVICE — APPLICATOR, COTTON TIP, WOODEN, 6IN, STERILE

## (undated) DEVICE — NEEDLE, HYDRODISSECTION 27G 8MM BEND